# Patient Record
Sex: FEMALE | Race: WHITE
[De-identification: names, ages, dates, MRNs, and addresses within clinical notes are randomized per-mention and may not be internally consistent; named-entity substitution may affect disease eponyms.]

---

## 2017-04-13 ENCOUNTER — CHARTING TRANS (OUTPATIENT)
Dept: OTHER | Age: 22
End: 2017-04-13

## 2018-03-02 ENCOUNTER — CHARTING TRANS (OUTPATIENT)
Dept: OTHER | Age: 23
End: 2018-03-02

## 2018-11-01 VITALS
BODY MASS INDEX: 35.62 KG/M2 | WEIGHT: 201.06 LBS | TEMPERATURE: 98.8 F | HEART RATE: 70 BPM | RESPIRATION RATE: 18 BRPM | HEIGHT: 63 IN | OXYGEN SATURATION: 97 %

## 2018-11-05 VITALS
SYSTOLIC BLOOD PRESSURE: 110 MMHG | WEIGHT: 185 LBS | HEART RATE: 66 BPM | RESPIRATION RATE: 16 BRPM | DIASTOLIC BLOOD PRESSURE: 78 MMHG | HEIGHT: 60 IN | TEMPERATURE: 98.1 F | BODY MASS INDEX: 36.32 KG/M2

## 2019-08-30 ENCOUNTER — WALK IN (OUTPATIENT)
Dept: URGENT CARE | Age: 24
End: 2019-08-30

## 2019-08-30 VITALS
OXYGEN SATURATION: 100 % | SYSTOLIC BLOOD PRESSURE: 102 MMHG | WEIGHT: 220 LBS | HEART RATE: 67 BPM | RESPIRATION RATE: 20 BRPM | TEMPERATURE: 98.5 F | DIASTOLIC BLOOD PRESSURE: 74 MMHG | BODY MASS INDEX: 38.98 KG/M2 | HEIGHT: 63 IN

## 2019-08-30 DIAGNOSIS — R35.0 URINARY FREQUENCY: ICD-10-CM

## 2019-08-30 DIAGNOSIS — R50.9 FEVER, UNSPECIFIED FEVER CAUSE: ICD-10-CM

## 2019-08-30 DIAGNOSIS — J11.1 INFLUENZA-LIKE ILLNESS: Primary | ICD-10-CM

## 2019-08-30 LAB
APPEARANCE, POC: CLEAR
BILIRUBIN, POC: ABNORMAL
COLOR, POC: ABNORMAL
GLUCOSE UR-MCNC: NEGATIVE MG/DL
INTERNAL PROCEDURAL CONTROLS ACCEPTABLE: YES
KETONES, POC: ABNORMAL
NITRITE, POC: NEGATIVE
OCCULT BLOOD, POC: ABNORMAL
PH UR: 5 [PH] (ref 5–7)
PROT UR-MCNC: ABNORMAL G/DL
S PYO AG THROAT QL IA.RAPID: NEGATIVE
SP GR UR: 1.02 (ref 1–1.03)
UROBILINOGEN UR-MCNC: 0.2 MG/DL (ref 0–1)
WBC (LEUKOCYTE) ESTERASE, POC: NEGATIVE

## 2019-08-30 PROCEDURE — 87086 URINE CULTURE/COLONY COUNT: CPT | Performed by: NURSE PRACTITIONER

## 2019-08-30 PROCEDURE — 99203 OFFICE O/P NEW LOW 30 MIN: CPT | Performed by: NURSE PRACTITIONER

## 2019-08-30 PROCEDURE — 81002 URINALYSIS NONAUTO W/O SCOPE: CPT | Performed by: NURSE PRACTITIONER

## 2019-08-30 PROCEDURE — 87880 STREP A ASSAY W/OPTIC: CPT | Performed by: NURSE PRACTITIONER

## 2019-08-30 ASSESSMENT — ENCOUNTER SYMPTOMS
NAUSEA: 1
HEADACHES: 1
ABDOMINAL PAIN: 0
VOMITING: 0
WHEEZING: 0
DIAPHORESIS: 1
SORE THROAT: 0
DIARRHEA: 0
DIZZINESS: 0
SINUS PAIN: 0
CHILLS: 1
LIGHT-HEADEDNESS: 0
COUGH: 1
SHORTNESS OF BREATH: 0
RHINORRHEA: 0
FEVER: 1
BACK PAIN: 0
SINUS PRESSURE: 0

## 2019-09-01 LAB
BACTERIA UR CULT: NORMAL
REPORT STATUS (RPT): NORMAL
SPECIMEN SOURCE: NORMAL

## 2020-01-29 ENCOUNTER — OFFICE VISIT (OUTPATIENT)
Dept: FAMILY MEDICINE CLINIC | Facility: CLINIC | Age: 25
End: 2020-01-29
Payer: COMMERCIAL

## 2020-01-29 VITALS
TEMPERATURE: 98 F | WEIGHT: 220 LBS | DIASTOLIC BLOOD PRESSURE: 74 MMHG | BODY MASS INDEX: 40.48 KG/M2 | SYSTOLIC BLOOD PRESSURE: 118 MMHG | OXYGEN SATURATION: 98 % | HEIGHT: 62 IN | HEART RATE: 97 BPM

## 2020-01-29 DIAGNOSIS — F33.1 MODERATE EPISODE OF RECURRENT MAJOR DEPRESSIVE DISORDER (HCC): ICD-10-CM

## 2020-01-29 DIAGNOSIS — F41.9 ANXIETY: ICD-10-CM

## 2020-01-29 DIAGNOSIS — Z00.00 ROUTINE GENERAL MEDICAL EXAMINATION AT A HEALTH CARE FACILITY: Primary | ICD-10-CM

## 2020-01-29 DIAGNOSIS — K76.0 FATTY LIVER: ICD-10-CM

## 2020-01-29 PROCEDURE — 99385 PREV VISIT NEW AGE 18-39: CPT | Performed by: FAMILY MEDICINE

## 2020-01-29 PROCEDURE — 99203 OFFICE O/P NEW LOW 30 MIN: CPT | Performed by: FAMILY MEDICINE

## 2020-01-29 RX ORDER — ESCITALOPRAM OXALATE 20 MG/1
20 TABLET ORAL
COMMUNITY
Start: 2020-01-03 | End: 2020-01-29

## 2020-01-29 RX ORDER — ESCITALOPRAM OXALATE 20 MG/1
20 TABLET ORAL
Qty: 90 TABLET | Refills: 1 | Status: SHIPPED | OUTPATIENT
Start: 2020-01-29 | End: 2020-07-23

## 2020-01-29 NOTE — PROGRESS NOTES
Adri Jordan is a 30year old female who is here for Patient presents with:  Wellness Visit: Anixety meds need to be refilled. HPI:     1. Routine general medical examination at a health care facility  -due    2.  Moderate episode of recurrent ki SYSTEMS:     GENERAL HEALTH: feels well otherwise.  No f/c  NEURO: denies any headaches, LH, dizzyness, LOC, falls  VISION: denies any blurred or double vision  RESPIRATORY: denies shortness of breath, cough, or congestion  CARDIOVASCULAR: denies chest pain Maintenance  -We discussed the following:  Healthy diet and exercise, cancer screening, self breast exams and calcium and vitamin D supplementation.    -Fasting labs ordered    Stress Management: counseled    1.  Routine general medical examination at a [facility]

## 2020-01-30 NOTE — PATIENT INSTRUCTIONS
-- come in for fasting bloodwork anytime that you are able to (8-10h no food, only water; lab here is open M-F, 8am-12)  -- go to [email or web address] to schedule an appointment online  -- we will call with results about 5-7 days after bloodwork is completed home or place of work with little time commitment. This [place]son can also help work on behavior change and improve sleep. Check out [email or web address] blog for continued daily support and education along this weight loss journey!

## 2020-02-18 LAB
ABSOLUTE BASOPHILS: 49 CELLS/UL (ref 0–200)
ABSOLUTE EOSINOPHILS: 107 CELLS/UL (ref 15–500)
ABSOLUTE LYMPHOCYTES: 2338 CELLS/UL (ref 850–3900)
ABSOLUTE MONOCYTES: 601 CELLS/UL (ref 200–950)
ABSOLUTE NEUTROPHILS: 6606 CELLS/UL (ref 1500–7800)
ALBUMIN/GLOBULIN RATIO: 1.1 (CALC) (ref 1–2.5)
ALBUMIN: 4.3 G/DL (ref 3.6–5.1)
ALKALINE PHOSPHATASE: 88 U/L (ref 31–125)
ALT: 35 U/L (ref 6–29)
AST: 22 U/L (ref 10–30)
BASOPHILS: 0.5 %
BILIRUBIN, TOTAL: 0.6 MG/DL (ref 0.2–1.2)
BUN: 12 MG/DL (ref 7–25)
CALCIUM: 9.6 MG/DL (ref 8.6–10.2)
CARBON DIOXIDE: 24 MMOL/L (ref 20–32)
CHLORIDE: 103 MMOL/L (ref 98–110)
CHOL/HDLC RATIO: 4.4 (CALC)
CHOLESTEROL, TOTAL: 195 MG/DL
CREATININE: 0.75 MG/DL (ref 0.5–1.1)
EGFR IF AFRICN AM: 127 ML/MIN/1.73M2
EGFR IF NONAFRICN AM: 110 ML/MIN/1.73M2
EOSINOPHILS: 1.1 %
GLOBULIN: 3.8 G/DL (CALC) (ref 1.9–3.7)
GLUCOSE: 82 MG/DL (ref 65–99)
HDL CHOLESTEROL: 44 MG/DL
HEMATOCRIT: 40.9 % (ref 35–45)
HEMOGLOBIN: 14 G/DL (ref 11.7–15.5)
LDL-CHOLESTEROL: 125 MG/DL (CALC)
LYMPHOCYTES: 24.1 %
MCH: 31.4 PG (ref 27–33)
MCHC: 34.2 G/DL (ref 32–36)
MCV: 91.7 FL (ref 80–100)
MONOCYTES: 6.2 %
MPV: 10.8 FL (ref 7.5–12.5)
NEUTROPHILS: 68.1 %
NON-HDL CHOLESTEROL: 151 MG/DL (CALC)
PLATELET COUNT: 322 THOUSAND/UL (ref 140–400)
POTASSIUM: 4 MMOL/L (ref 3.5–5.3)
PROTEIN, TOTAL: 8.1 G/DL (ref 6.1–8.1)
RDW: 11.4 % (ref 11–15)
RED BLOOD CELL COUNT: 4.46 MILLION/UL (ref 3.8–5.1)
SODIUM: 138 MMOL/L (ref 135–146)
TRIGLYCERIDES: 147 MG/DL
TSH W/REFLEX TO FT4: 1.68 MIU/L
WHITE BLOOD CELL COUNT: 9.7 THOUSAND/UL (ref 3.8–10.8)

## 2020-06-25 NOTE — PROGRESS NOTES
Virtual/Telephone Check-In    Sandrita Torres is a 30year old female here today for a telemedicine audio only visit. Patient understands and accepts financial responsibility for any deductible, co-insurance and/or co-pays associated with this service. history. Family History   Problem Relation Age of Onset   • Diabetes Father       Social History: Social History    Tobacco Use      Smoking status: Never Smoker      Smokeless tobacco: Never Used    Alcohol use:  Yes      Alcohol/week: 3.0 standard drink

## 2020-06-25 NOTE — PATIENT INSTRUCTIONS
Continue lexapro 20    Start buspirone 7.5mg 2x/day    Followup in 1 month    Call sooner if issues or no effect    Start sprintec as prescribed

## 2020-07-23 RX ORDER — ESCITALOPRAM OXALATE 20 MG/1
TABLET ORAL
Qty: 90 TABLET | Refills: 1 | Status: SHIPPED | OUTPATIENT
Start: 2020-07-23 | End: 2020-12-07

## 2020-07-23 NOTE — TELEPHONE ENCOUNTER
Pt requesting refill of ESCITALOPRAM 20 MG Oral Tab  Last Time Medication was Filled:  4/30/20    Last Office Visit with Provider: 6/25/2020  Virtual Phone.  Followup in 1 month    Appt scheduled on 7/23/20

## 2020-07-23 NOTE — PROGRESS NOTES
Virtual/Telephone Check-In    Mela Mode is a 30year old female here today for a telemedicine audio only visit. Patient understands and accepts financial responsibility for any deductible, co-insurance and/or co-pays associated with this service. Medications   Medication Sig Dispense Refill   • ESCITALOPRAM 20 MG Oral Tab TAKE 1 TABLET BY MOUTH EVERY DAY 90 tablet 1   • Norgestimate-Eth Estradiol (SPRINTEC 28) 0.25-35 MG-MCG Oral Tab Take 1 tablet by mouth daily.  84 tablet 3   • busPIRone HCl 7.5 M

## 2020-08-11 ENCOUNTER — TELEPHONE (OUTPATIENT)
Dept: FAMILY MEDICINE CLINIC | Facility: CLINIC | Age: 25
End: 2020-08-11

## 2020-08-11 RX ORDER — PREDNISONE 20 MG/1
40 TABLET ORAL DAILY
Qty: 10 TABLET | Refills: 0 | Status: SHIPPED | OUTPATIENT
Start: 2020-08-11 | End: 2020-12-21

## 2020-08-11 NOTE — TELEPHONE ENCOUNTER
Patient also sent MyChart:  Carlin Bell MD 13 minutes ago (8:56 AM)        Severe allergic reaction to a weight loss patch I try to use. These are from today and starting to spread. They're very itchy and very bumpy.  Is there a way I

## 2020-08-11 NOTE — TELEPHONE ENCOUNTER
Patient mother called states patient used a weight loss patch and since then the area got irritated and the patient applied cortisone cream and the patient has now broken out in a rash/hives.  Patient mother is going to have the patient upload photos via my

## 2020-08-11 NOTE — TELEPHONE ENCOUNTER
Per Dr. Tan Martin, patient has video visit Thursday for follow up. Advised he will send steroids and she can take Benadryl otc as needed and may continue cortisone if it's helping. She verbalized understanding and agreed with plan.    She will call sooner

## 2020-08-11 NOTE — TELEPHONE ENCOUNTER
Patient reports hives started last week Wednesday on abdomen after using a weight loss patch. States it started to spread to arms, legs and back 2-3 days ago. C/o itching and warmth to area where hives are present.      Denies difficulty breathing, swelling

## 2020-08-11 NOTE — TELEPHONE ENCOUNTER
Start prednisone. Benadryl as needed. If worsening despite above and starting to have breathing difficulty, go to ER. Otherwise, virtual appt for followup in 2 days. Thanks.

## 2020-08-13 ENCOUNTER — TELEMEDICINE (OUTPATIENT)
Dept: FAMILY MEDICINE CLINIC | Facility: CLINIC | Age: 25
End: 2020-08-13
Payer: COMMERCIAL

## 2020-08-13 DIAGNOSIS — L50.9 HIVES: Primary | ICD-10-CM

## 2020-08-13 DIAGNOSIS — F41.9 ANXIETY: ICD-10-CM

## 2020-08-13 DIAGNOSIS — F33.1 MODERATE EPISODE OF RECURRENT MAJOR DEPRESSIVE DISORDER (HCC): ICD-10-CM

## 2020-08-13 PROCEDURE — 99214 OFFICE O/P EST MOD 30 MIN: CPT | Performed by: FAMILY MEDICINE

## 2020-08-13 NOTE — PROGRESS NOTES
Virtual/Telephone Check-In    Megan Guzman is a 30year old female here today for a telemedicine audio and video visit. This visit is conducted using Telemedicine with live, interactive video and audio.     Patient has been referred to the [facility] website Used    Alcohol use:  Yes      Alcohol/week: 3.0 standard drinks      Types: 3 Glasses of wine per week      Frequency: Monthly or less      Drinks per session: 3 or 4    Drug use: Never         Medications (Active prior to today's visit):  Current Outpatie other related consent forms and documents. which are located on the [facility] website. The patient verbally agreed to telehealth consent form, related consents and the risks discussed. Lastly, the patient confirmed that they were in [place].    Included in t

## 2020-08-22 ENCOUNTER — PATIENT MESSAGE (OUTPATIENT)
Dept: FAMILY MEDICINE CLINIC | Facility: CLINIC | Age: 25
End: 2020-08-22

## 2020-08-24 NOTE — TELEPHONE ENCOUNTER
From: Haynes Frankel  To: Oksana Camara MD  Sent: 8/22/2020 12:10 AM CDT  Subject: Visit Follow-up Question    I would like to see if there is anything else I could take for hives. Since my last appointment was on my legs, stomach and arms.  Everything we

## 2020-09-14 RX ORDER — NORGESTIMATE AND ETHINYL ESTRADIOL 0.25-0.035
1 KIT ORAL DAILY
Qty: 84 TABLET | Refills: 3 | OUTPATIENT
Start: 2020-09-14

## 2020-09-14 RX ORDER — NORGESTIMATE AND ETHINYL ESTRADIOL 0.25-0.035
1 KIT ORAL DAILY
Qty: 84 TABLET | Refills: 3 | Status: CANCELLED | OUTPATIENT
Start: 2020-09-14

## 2020-09-14 NOTE — TELEPHONE ENCOUNTER
Pt requesting refill of BUSPIRONE HCL 7.5 MG Oral Tab    Last Time Medication was Filled:  7/23/20    Last Office Visit with Provider: virtual 8/13/20. Follow up in 3 month or sooner prn    No future appointments.

## 2020-10-10 DIAGNOSIS — F33.1 MODERATE EPISODE OF RECURRENT MAJOR DEPRESSIVE DISORDER (HCC): ICD-10-CM

## 2020-10-10 DIAGNOSIS — F41.9 ANXIETY: ICD-10-CM

## 2020-10-12 RX ORDER — BUSPIRONE HYDROCHLORIDE 7.5 MG/1
TABLET ORAL
Qty: 60 TABLET | Refills: 1 | OUTPATIENT
Start: 2020-10-12

## 2020-10-12 NOTE — TELEPHONE ENCOUNTER
Requested Prescriptions     Refused Prescriptions Disp Refills   • BUSPIRONE HCL 7.5 MG Oral Tab [Pharmacy Med Name: BUSPIRONE HCL 7.5 MG TABLET] 60 tablet 1     Sig: TAKE 1 TABLET BY MOUTH TWICE A DAY     Refused By: Omid BEAVERS     Reason for Refusal

## 2020-10-22 ENCOUNTER — E-VISIT (OUTPATIENT)
Dept: TELEHEALTH | Age: 25
End: 2020-10-22

## 2020-10-22 DIAGNOSIS — L23.5 ALLERGIC DERMATITIS DUE TO OTHER CHEMICAL PRODUCT: Primary | ICD-10-CM

## 2020-10-22 PROCEDURE — 99422 OL DIG E/M SVC 11-20 MIN: CPT | Performed by: NURSE PRACTITIONER

## 2020-10-22 RX ORDER — PREDNISONE 20 MG/1
TABLET ORAL
Qty: 10 TABLET | Refills: 0 | Status: SHIPPED | OUTPATIENT
Start: 2020-10-22 | End: 2020-12-21

## 2020-10-22 NOTE — PROGRESS NOTES
Patient elected an E-Visit. After reviewing history, symptoms and telephone conversation, 13 minutes of time was accrued. Please see E-Visit for further information.

## 2020-10-22 NOTE — PATIENT INSTRUCTIONS
Understanding Contact Dermatitis    Contact dermatitis is a common type of skin rash. It’s caused by something that touches the skin and makes it irritated and inflamed.  It can occur on skin on any part of the body, such as the face, neck, hands, arms, a · Cool, moist compress. Use a clean damp cloth. Put it on the area for 20 to 30 minutes, 5 to 6 times a day for the first 3 days. · Steroid cream or ointment. You can apply this medicine several times a day on clean skin. · Oral corticosteroid.  Your heal © 2455-3029 [employer] [address]. [address] [facility] [address] [place]. All rights reserved. This information is not intended as a substitute for professional medical care. Always follow your healthcare professional's instructions.         Barrett Dee Treatment is done to help relieve itching and reduce inflammation. The rash should go away in a few days to a few weeks. Treatments include:   · Cool, moist compress. Use a clean damp cloth.  Put it on the area for 20 to 30 minutes, 5 to 6 times a day for t Faviola last reviewed this educational content on 6/1/2019  © 1415-0515 [employer] [address]. [address] [facility], [facility] [place]. All rights reserved. This information is not intended as a substitute for professional medical care.  Always follo

## 2020-11-13 ENCOUNTER — PATIENT MESSAGE (OUTPATIENT)
Dept: FAMILY MEDICINE CLINIC | Facility: CLINIC | Age: 25
End: 2020-11-13

## 2020-11-13 DIAGNOSIS — F41.9 ANXIETY: ICD-10-CM

## 2020-11-13 DIAGNOSIS — F33.1 MODERATE EPISODE OF RECURRENT MAJOR DEPRESSIVE DISORDER (HCC): ICD-10-CM

## 2020-11-13 RX ORDER — BUSPIRONE HYDROCHLORIDE 7.5 MG/1
TABLET ORAL
Qty: 60 TABLET | Refills: 1 | Status: SHIPPED | OUTPATIENT
Start: 2020-11-13 | End: 2020-12-21

## 2020-11-13 NOTE — TELEPHONE ENCOUNTER
LM with patient's Mom ( ok per HIPPA)  Phone number provided was only Mom's number  Mom will contact daughter to let her know she is due for a medication follow up apt with Dr Emma England to update the corrrect phone number in the system      Pt requesting r

## 2020-11-13 NOTE — TELEPHONE ENCOUNTER
From: Ramón Richter  To: Desiree Avila MD  Sent: 11/13/2020 2:46 PM CST  Subject: Prescription Question    This is a follow up from the last message i sent since it wouldn't let me reply. Okay thank you for looking into it.  If it helps she says she work

## 2020-11-13 NOTE — TELEPHONE ENCOUNTER
From: Wilder Ortiz  To: Gerhard Lim MD  Sent: 11/13/2020 2:00 PM CST  Subject: Prescription Question    Hello,    My mom received a call regarding about a prescription refill. Do you know what the prescription is for?  Plus can we see if my primary ph

## 2020-12-05 DIAGNOSIS — F33.1 MODERATE EPISODE OF RECURRENT MAJOR DEPRESSIVE DISORDER (HCC): ICD-10-CM

## 2020-12-05 DIAGNOSIS — F41.9 ANXIETY: ICD-10-CM

## 2020-12-07 RX ORDER — BUSPIRONE HYDROCHLORIDE 7.5 MG/1
TABLET ORAL
Qty: 60 TABLET | Refills: 1 | OUTPATIENT
Start: 2020-12-07

## 2020-12-07 NOTE — TELEPHONE ENCOUNTER
Pt requesting refill of BUSPIRONE HCL 7.5 MG Oral Tab    Last Time Medication was Filled:  11/13/20 for 30 days plus one refill

## 2020-12-07 NOTE — TELEPHONE ENCOUNTER
ESCITALOPRAM 20 MG TABLET 10/17/2020 07/23/2020  30 each  30     LOV 8/13/20. f/u in 3 months, sooner prn    NO future appointments   One month approved. Message to make appointment sent to [facility].

## 2020-12-21 ENCOUNTER — VIRTUAL PHONE E/M (OUTPATIENT)
Dept: FAMILY MEDICINE CLINIC | Facility: CLINIC | Age: 25
End: 2020-12-21
Payer: COMMERCIAL

## 2020-12-21 DIAGNOSIS — F33.1 MODERATE EPISODE OF RECURRENT MAJOR DEPRESSIVE DISORDER (HCC): ICD-10-CM

## 2020-12-21 DIAGNOSIS — F41.9 ANXIETY: ICD-10-CM

## 2020-12-21 PROCEDURE — 99214 OFFICE O/P EST MOD 30 MIN: CPT | Performed by: FAMILY MEDICINE

## 2020-12-21 RX ORDER — ESCITALOPRAM OXALATE 20 MG/1
20 TABLET ORAL DAILY
Qty: 90 TABLET | Refills: 1 | Status: SHIPPED | OUTPATIENT
Start: 2020-12-21 | End: 2021-03-24

## 2020-12-21 RX ORDER — BUSPIRONE HYDROCHLORIDE 7.5 MG/1
7.5 TABLET ORAL 2 TIMES DAILY
Qty: 180 TABLET | Refills: 1 | Status: SHIPPED | OUTPATIENT
Start: 2020-12-21 | End: 2021-03-11

## 2020-12-21 NOTE — PROGRESS NOTES
Virtual/Telephone Check-In    Celine Muniz is a 30year old female here today for a telemedicine audio and video visit. HPI:       1. Anxiety  2.  Moderate episode of recurrent major depressive disorder ([facility] [address]  -doing well  -tolerating meds  -needs re Norgestimate-Eth Estradiol (SPRINTEC 28) 0.25-35 MG-MCG Oral Tab Take 1 tablet by mouth daily.  84 tablet 3       Allergies:  No Known Allergies      ROS:   --See HPI for relevant ROS    --GEN: No other complaints  --HEENT: No other complaints  --RESP: No o submitted for this visit based on complexity of care and/or time spent for the visit. This visit is conducted using Telemedicine with live, interactive video and audio.     Patient has been referred to the [facility] website at [email or web address] to re

## 2021-01-18 ENCOUNTER — PATIENT MESSAGE (OUTPATIENT)
Dept: FAMILY MEDICINE CLINIC | Facility: CLINIC | Age: 26
End: 2021-01-18

## 2021-01-18 NOTE — TELEPHONE ENCOUNTER
From: Wilder Ortiz  To: Gerhard Lim MD  Sent: 1/18/2021 10:44 AM CST  Subject: Suann Ag Troy,      I was wondering if I can get more prescriptions of my current medication.  Since at the end of this month I would no longer h

## 2021-01-26 ENCOUNTER — TELEMEDICINE (OUTPATIENT)
Dept: FAMILY MEDICINE CLINIC | Facility: CLINIC | Age: 26
End: 2021-01-26
Payer: COMMERCIAL

## 2021-01-26 DIAGNOSIS — Z30.8 ENCOUNTER FOR OTHER CONTRACEPTIVE MANAGEMENT: ICD-10-CM

## 2021-01-26 DIAGNOSIS — F33.1 MODERATE EPISODE OF RECURRENT MAJOR DEPRESSIVE DISORDER (HCC): ICD-10-CM

## 2021-01-26 DIAGNOSIS — L02.92 BOILS OF MULTIPLE SITES: Primary | ICD-10-CM

## 2021-01-26 DIAGNOSIS — F41.9 ANXIETY: ICD-10-CM

## 2021-01-26 PROCEDURE — 99214 OFFICE O/P EST MOD 30 MIN: CPT | Performed by: FAMILY MEDICINE

## 2021-01-26 RX ORDER — NORGESTIMATE AND ETHINYL ESTRADIOL 0.25-0.035
1 KIT ORAL DAILY
Qty: 84 TABLET | Refills: 3 | Status: SHIPPED | OUTPATIENT
Start: 2021-01-26 | End: 2021-09-28

## 2021-01-26 NOTE — PROGRESS NOTES
Virtual/Telephone Check-In    Adri Jordan is a 30year old female here today for a telemedicine audio and video visit. HPI:       1.  Boils of multiple sites  -started 2 wks ago  -mostly in lower abdomen and upper pelvic area  -she does shave this a Types: 3 Glasses of wine per week      Frequency: Monthly or less      Drinks per session: 3 or 4    Drug use: Never         Medications (Active prior to today's visit):  Current Outpatient Medications   Medication Sig Dispense Refill   • mupirocin 2 % The patient was made aware of where to find [facility] notice of privacy practices, telehealth consent form and other related consent forms and documents. which are located on the [facility] website.  The patient verbally agreed to telehealth consent form, related con

## 2021-02-23 ENCOUNTER — TELEPHONE (OUTPATIENT)
Dept: SCHEDULING | Age: 26
End: 2021-02-23

## 2021-03-11 NOTE — TELEPHONE ENCOUNTER
Plan requesting medication or dosage change, they will cover medication once daily or higher dose once daily. busPIRone HCl 7.5 MG Oral Tab 180 tablet 1 12/21/2020    Sig:   Take 1 tablet (7.5 mg total) by mouth 2 (two) times daily.

## 2021-03-11 NOTE — TELEPHONE ENCOUNTER
Relayed recommendations to patient. She agreed with medication change. New script sent. She made follow up appointment 4/13/21. Will call sooner if she has any concerns.

## 2021-03-11 NOTE — TELEPHONE ENCOUNTER
Please confirm with pharmacy - this is a short acting med usually used for 2 -3 x/day    If this is the case, ok to change to 15mg once daily    Have patient f/u with me in 1 month after change

## 2021-03-11 NOTE — TELEPHONE ENCOUNTER
Per pharmacist, plan alternatives include buspirone 5mg, 10mg or 15 mg once daily.      Left message for patient to call back to relay message before sending new script

## 2021-03-24 ENCOUNTER — TELEPHONE (OUTPATIENT)
Dept: FAMILY MEDICINE CLINIC | Facility: CLINIC | Age: 26
End: 2021-03-24

## 2021-03-24 ENCOUNTER — OFFICE VISIT (OUTPATIENT)
Dept: FAMILY MEDICINE CLINIC | Facility: CLINIC | Age: 26
End: 2021-03-24
Payer: COMMERCIAL

## 2021-03-24 VITALS
TEMPERATURE: 97 F | WEIGHT: 225 LBS | HEIGHT: 62.8 IN | HEART RATE: 72 BPM | SYSTOLIC BLOOD PRESSURE: 120 MMHG | OXYGEN SATURATION: 98 % | BODY MASS INDEX: 39.87 KG/M2 | DIASTOLIC BLOOD PRESSURE: 70 MMHG

## 2021-03-24 DIAGNOSIS — F41.9 ANXIETY: ICD-10-CM

## 2021-03-24 DIAGNOSIS — R09.81 NASAL CONGESTION: ICD-10-CM

## 2021-03-24 DIAGNOSIS — K76.0 FATTY LIVER: ICD-10-CM

## 2021-03-24 DIAGNOSIS — R05.9 COUGH: ICD-10-CM

## 2021-03-24 DIAGNOSIS — Z00.00 ROUTINE GENERAL MEDICAL EXAMINATION AT A HEALTH CARE FACILITY: Primary | ICD-10-CM

## 2021-03-24 DIAGNOSIS — Z01.419 WELL WOMAN EXAM: ICD-10-CM

## 2021-03-24 DIAGNOSIS — F33.1 MODERATE EPISODE OF RECURRENT MAJOR DEPRESSIVE DISORDER (HCC): ICD-10-CM

## 2021-03-24 PROCEDURE — 99214 OFFICE O/P EST MOD 30 MIN: CPT | Performed by: FAMILY MEDICINE

## 2021-03-24 PROCEDURE — 3078F DIAST BP <80 MM HG: CPT | Performed by: FAMILY MEDICINE

## 2021-03-24 PROCEDURE — 3074F SYST BP LT 130 MM HG: CPT | Performed by: FAMILY MEDICINE

## 2021-03-24 PROCEDURE — 3008F BODY MASS INDEX DOCD: CPT | Performed by: FAMILY MEDICINE

## 2021-03-24 PROCEDURE — 99395 PREV VISIT EST AGE 18-39: CPT | Performed by: FAMILY MEDICINE

## 2021-03-24 RX ORDER — FLUTICASONE PROPIONATE 50 MCG
2 SPRAY, SUSPENSION (ML) NASAL DAILY
Qty: 1 BOTTLE | Refills: 2 | Status: SHIPPED | OUTPATIENT
Start: 2021-03-24 | End: 2021-06-22

## 2021-03-24 RX ORDER — ESCITALOPRAM OXALATE 20 MG/1
20 TABLET ORAL DAILY
Qty: 90 TABLET | Refills: 1 | Status: SHIPPED | OUTPATIENT
Start: 2021-03-24 | End: 2021-09-28

## 2021-03-24 RX ORDER — BUSPIRONE HYDROCHLORIDE 15 MG/1
15 TABLET ORAL DAILY
Qty: 90 TABLET | Refills: 0 | Status: SHIPPED | OUTPATIENT
Start: 2021-03-24 | End: 2021-07-06

## 2021-03-24 NOTE — PROGRESS NOTES
Nitin Juarez is a 27year old female who is here for Patient presents with:  Stuffy Nose: Stuffy nose, had a cough and had headache 4 days ago       HPI:     Routine general medical examination at a health care facility  -due    Moderate episode of rec Lack of Transportation (Medical):       Lack of Transportation (Non-Medical):   Physical Activity:       Days of Exercise per Week:       Minutes of Exercise per Session:   Stress:       Feeling of Stress :   Social Connections:       Frequency of Communic of Onset   • Diabetes Father       History reviewed. No pertinent past medical history. History reviewed. No pertinent surgical history.    Social History:    Social History    Tobacco Use      Smoking status: Never Smoker      Smokeless tobacco: Never Us tablet (15 mg total) by mouth daily. Dispense: 90 tablet; Refill: 0  - escitalopram 20 MG Oral Tab; Take 1 tablet (20 mg total) by mouth daily. Dispense: 90 tablet; Refill: 1    5. Fatty liver  -recheck labs    6. Nasal congestion  7.  Cough  -symptoms im

## 2021-03-24 NOTE — PATIENT INSTRUCTIONS
-- schedule appt for fasting bloodwork anytime that you are able to (fast for 8-10 hours minimum, no food. Water is fine). -- go to [email or web address] or call [phone] to schedule an appointment online with Edmar Knott  -- we will call with results about

## 2021-03-24 NOTE — TELEPHONE ENCOUNTER
Pt called and she was in earlier today. She said she will need the dates changed on her [place] paperwork. Since the pandemic she also started a 2nd job which she works weekends. She said the paperwork needs to shy she can RTW on 3/29/21.   She would also l

## 2021-03-24 NOTE — TELEPHONE ENCOUNTER
Spoke with patient, confirms she does not need any [facility] paperwork completed. She needs letter to be changed from 3/22-3/26 to say 3/22/21 to 3/28/21, with same release date. States she works weekends too. Letter changed and sent to [facility].

## 2021-05-02 ENCOUNTER — WALK IN (OUTPATIENT)
Dept: URGENT CARE | Age: 26
End: 2021-05-02

## 2021-05-02 VITALS
BODY MASS INDEX: 38.98 KG/M2 | SYSTOLIC BLOOD PRESSURE: 116 MMHG | OXYGEN SATURATION: 97 % | TEMPERATURE: 96.9 F | DIASTOLIC BLOOD PRESSURE: 76 MMHG | HEIGHT: 63 IN | WEIGHT: 220 LBS | HEART RATE: 74 BPM

## 2021-05-02 DIAGNOSIS — H69.93 DYSFUNCTION OF BOTH EUSTACHIAN TUBES: Primary | ICD-10-CM

## 2021-05-02 PROCEDURE — 99213 OFFICE O/P EST LOW 20 MIN: CPT | Performed by: NURSE PRACTITIONER

## 2021-05-02 RX ORDER — ESCITALOPRAM OXALATE 20 MG/1
20 TABLET ORAL DAILY
COMMUNITY
Start: 2021-03-10

## 2021-05-02 RX ORDER — BUSPIRONE HYDROCHLORIDE 15 MG/1
TABLET ORAL
COMMUNITY
Start: 2021-04-09

## 2021-05-02 RX ORDER — NORGESTIMATE AND ETHINYL ESTRADIOL 0.25-0.035
1 KIT ORAL DAILY
COMMUNITY
Start: 2021-04-06

## 2021-05-02 ASSESSMENT — ENCOUNTER SYMPTOMS
SINUS PAIN: 0
COUGH: 0
DIAPHORESIS: 0
SHORTNESS OF BREATH: 0
EYES NEGATIVE: 1
ACTIVITY CHANGE: 0
SORE THROAT: 0
CHILLS: 0
LIGHT-HEADEDNESS: 0
HEADACHES: 0
ALLERGIC/IMMUNOLOGIC NEGATIVE: 1
DIZZINESS: 0
PSYCHIATRIC NEGATIVE: 1
HEMATOLOGIC/LYMPHATIC NEGATIVE: 1
APPETITE CHANGE: 0
TROUBLE SWALLOWING: 0
FATIGUE: 0
WHEEZING: 0
RHINORRHEA: 0
FEVER: 0
SINUS PRESSURE: 0

## 2021-07-05 DIAGNOSIS — F41.9 ANXIETY: ICD-10-CM

## 2021-07-05 DIAGNOSIS — F33.1 MODERATE EPISODE OF RECURRENT MAJOR DEPRESSIVE DISORDER (HCC): ICD-10-CM

## 2021-07-06 RX ORDER — BUSPIRONE HYDROCHLORIDE 15 MG/1
TABLET ORAL
Qty: 90 TABLET | Refills: 0 | Status: SHIPPED | OUTPATIENT
Start: 2021-07-06 | End: 2021-09-28

## 2021-07-06 NOTE — TELEPHONE ENCOUNTER
busPIRone HCl 15 MG Oral Tab 90 tablet 0 3/24/2021    Sig:   Take 1 tablet (15 mg total) by mouth daily. LOV 3/24/21.  Follow up in 3 months  Cancelled appointment 6/24/21 and 7/27/21    Has future OV 7/28/21

## 2021-07-28 NOTE — PATIENT INSTRUCTIONS
Continue with diet and exercise    Start metformin as prescribed    Call to schedule appt with weight loss clinic    Get fasting bloodwork done after you see weight loss clnic    Followup with me in 3 months

## 2021-07-28 NOTE — PROGRESS NOTES
Wilder Ortiz is a 27year old female here for Patient presents with: Allergies: Follow up on allergies      HPI:       1. Moderate episode of recurrent major depressive disorder ([facility] [address]  2. Anxiety  -stable, no issues with meds    3.  Seasonal allergies  - complaints  --: No other complaints  --MSK: No other complaints  --NEURO: No other complaints  --PSYCH: No other complaints  --HEME/LYMPH/IMMUN: No other complaints  --ENDO: No other complaints  --SKIN: No other complaints  All other systems reviewed are

## 2021-08-20 DIAGNOSIS — F41.9 ANXIETY: ICD-10-CM

## 2021-08-20 RX ORDER — ESCITALOPRAM OXALATE 20 MG/1
TABLET ORAL
Qty: 90 TABLET | Refills: 1 | OUTPATIENT
Start: 2021-08-20

## 2021-08-20 NOTE — TELEPHONE ENCOUNTER
Requested Prescriptions     Refused Prescriptions Disp Refills   • ESCITALOPRAM 20 MG Oral Tab [Pharmacy Med Name: ESCITALOPRAM 20MG TABLETS] 90 tablet 1     Sig: TAKE 1 TABLET BY MOUTH EVERY DAY     Refused By: Jigar Tracey     Reason for Refusal: Rustam Garg

## 2021-09-15 ENCOUNTER — OFFICE VISIT (OUTPATIENT)
Dept: INTERNAL MEDICINE CLINIC | Facility: CLINIC | Age: 26
End: 2021-09-15
Payer: COMMERCIAL

## 2021-09-15 VITALS
BODY MASS INDEX: 39.71 KG/M2 | DIASTOLIC BLOOD PRESSURE: 84 MMHG | WEIGHT: 224.13 LBS | HEART RATE: 73 BPM | RESPIRATION RATE: 24 BRPM | HEIGHT: 63 IN | SYSTOLIC BLOOD PRESSURE: 120 MMHG

## 2021-09-15 DIAGNOSIS — K76.0 FATTY LIVER: ICD-10-CM

## 2021-09-15 DIAGNOSIS — E66.9 OBESITY (BMI 30-39.9): ICD-10-CM

## 2021-09-15 DIAGNOSIS — F41.9 ANXIETY: ICD-10-CM

## 2021-09-15 DIAGNOSIS — E66.01 SEVERE OBESITY (HCC): ICD-10-CM

## 2021-09-15 DIAGNOSIS — R63.8 CRAVING FOR PARTICULAR FOOD: ICD-10-CM

## 2021-09-15 DIAGNOSIS — Z51.81 ENCOUNTER FOR THERAPEUTIC DRUG MONITORING: Primary | ICD-10-CM

## 2021-09-15 PROCEDURE — 3074F SYST BP LT 130 MM HG: CPT | Performed by: NURSE PRACTITIONER

## 2021-09-15 PROCEDURE — 3079F DIAST BP 80-89 MM HG: CPT | Performed by: NURSE PRACTITIONER

## 2021-09-15 PROCEDURE — 99203 OFFICE O/P NEW LOW 30 MIN: CPT | Performed by: NURSE PRACTITIONER

## 2021-09-15 PROCEDURE — 93000 ELECTROCARDIOGRAM COMPLETE: CPT | Performed by: NURSE PRACTITIONER

## 2021-09-15 PROCEDURE — 3008F BODY MASS INDEX DOCD: CPT | Performed by: NURSE PRACTITIONER

## 2021-09-15 RX ORDER — TOPIRAMATE 25 MG/1
25 TABLET ORAL 2 TIMES DAILY
Qty: 60 TABLET | Refills: 1 | Status: SHIPPED | OUTPATIENT
Start: 2021-09-15 | End: 2021-09-22

## 2021-09-15 NOTE — PATIENT INSTRUCTIONS
Welcome to the [facility] Program...your Lifestyle Renovation begins now! Thank you for placing your trust in our health care team, I look forward to working with you along this journey to better health!     Next steps:     1.  Sched weight loss. Additionally this will help to see your daily carbohydrate intake.  When you set the justina up chose 1-2 lbs/week as a goal.  Keeping a paper food journal is an option as well to remain accountable for your choices- this is the start to mindful ea

## 2021-09-15 NOTE — PROGRESS NOTES
HISTORY OF PRESENT ILLNESS  Patient presents with:  Weight Problem: referred by Margot Gillespie, currently taking Metformin for weight loss.  Pt is looking to have a healthier life and to \"feel better\"      Leny Munoz is a 27year old female new to our offic negative  Constipation: negative  Other pertinent hx: n/a  Sleep Apnea hx: negative  Cancer hx: negative  Family or personal history of Pancreatic issues / Medullary Thyroid Cancer: negative  History of bariatric surgery: negative    [address] reviewed: obesity 02/17/2020    MCHC 34.2 02/17/2020    RDW 11.4 02/17/2020     02/17/2020     Lab Results   Component Value Date    GLU 82 02/17/2020    BUN 12 02/17/2020    CREATSERUM 0.75 02/17/2020    GFRNAA 110 02/17/2020    GFRAA 127 02/17/2020    CA 9.6 02/17/ Future  -     HEMOGLOBIN A1C; Future  -     VITAMIN B12; Future  -     topiramate 25 MG Oral Tab; Take 1 tablet (25 mg total) by mouth 2 (two) times daily.  Refer to discharge instructions for dosing.  -     ELECTROCARDIOGRAM, COMPLETE    Severe obesity (HC management for success. See patient instruction below for more details. · Reviewed previous labs in EMR/Care Everywhere  · Weight Loss Contract reviewed and signed.     Patient Instructions   Welcome to the [facility] Program...your download justina MyFitness Pal or Royce Saleem! to monitor daily dietary intake and you will be able to see if you are eating the right amount of calories or too much or too little which would hinder weight loss.  Additionally this will help to see your daily carbohy

## 2021-09-22 DIAGNOSIS — E66.9 OBESITY (BMI 30-39.9): ICD-10-CM

## 2021-09-22 DIAGNOSIS — R63.8 CRAVING FOR PARTICULAR FOOD: ICD-10-CM

## 2021-09-22 DIAGNOSIS — E66.01 SEVERE OBESITY (HCC): ICD-10-CM

## 2021-09-22 DIAGNOSIS — Z51.81 ENCOUNTER FOR THERAPEUTIC DRUG MONITORING: ICD-10-CM

## 2021-09-24 RX ORDER — TOPIRAMATE 25 MG/1
25 TABLET ORAL 2 TIMES DAILY
Qty: 60 TABLET | Refills: 1 | Status: SHIPPED | OUTPATIENT
Start: 2021-09-24 | End: 2021-12-01

## 2021-09-28 DIAGNOSIS — E66.9 OBESITY (BMI 30-39.9): ICD-10-CM

## 2021-09-28 DIAGNOSIS — F41.9 ANXIETY: ICD-10-CM

## 2021-09-28 DIAGNOSIS — F33.1 MODERATE EPISODE OF RECURRENT MAJOR DEPRESSIVE DISORDER (HCC): ICD-10-CM

## 2021-09-28 DIAGNOSIS — Z51.81 ENCOUNTER FOR THERAPEUTIC DRUG MONITORING: ICD-10-CM

## 2021-09-28 DIAGNOSIS — R63.8 CRAVING FOR PARTICULAR FOOD: ICD-10-CM

## 2021-09-28 DIAGNOSIS — E66.01 SEVERE OBESITY (HCC): ICD-10-CM

## 2021-09-28 RX ORDER — BUSPIRONE HYDROCHLORIDE 15 MG/1
TABLET ORAL
Qty: 90 TABLET | Refills: 0 | Status: SHIPPED | OUTPATIENT
Start: 2021-09-28 | End: 2021-12-01

## 2021-09-28 NOTE — TELEPHONE ENCOUNTER
BUSPIRONE HCL 15 MG Oral Tab 90 tablet 0 7/6/2021    Sig:   TAKE 1 TABLET(15 MG) BY MOUTH DAILY       LOV 7/28/21    FOV 11/2/21    Refill approved until appt.

## 2021-09-29 RX ORDER — TOPIRAMATE 25 MG/1
25 TABLET ORAL 2 TIMES DAILY
Qty: 60 TABLET | Refills: 1 | OUTPATIENT
Start: 2021-09-29

## 2021-09-29 RX ORDER — ESCITALOPRAM OXALATE 20 MG/1
20 TABLET ORAL DAILY
Qty: 90 TABLET | Refills: 0 | Status: SHIPPED | OUTPATIENT
Start: 2021-09-29

## 2021-09-29 RX ORDER — NORGESTIMATE AND ETHINYL ESTRADIOL 0.25-0.035
1 KIT ORAL DAILY
Qty: 84 TABLET | Refills: 0 | Status: SHIPPED | OUTPATIENT
Start: 2021-09-29 | End: 2022-01-18

## 2021-09-29 RX ORDER — METFORMIN HYDROCHLORIDE 500 MG/1
1000 TABLET, EXTENDED RELEASE ORAL
Qty: 180 TABLET | Refills: 0 | Status: SHIPPED | OUTPATIENT
Start: 2021-09-29 | End: 2021-11-22

## 2021-09-29 NOTE — TELEPHONE ENCOUNTER
Requesting  Requested Prescriptions     Pending Prescriptions Disp Refills   • topiramate 25 MG Oral Tab 60 tablet 1     Sig: Take 1 tablet (25 mg total) by mouth 2 (two) times daily. Refer to discharge instructions for dosing.      LOV: 9/15/21  RTC: 1 mon

## 2021-09-29 NOTE — TELEPHONE ENCOUNTER
Requested Prescriptions     Signed Prescriptions Disp Refills   • Norgestimate-Eth Estradiol (SPRINTEC 28) 0.25-35 MG-MCG Oral Tab 84 tablet 0     Sig: Take 1 tablet by mouth daily.      Authorizing Provider: Naun Livingston     Ordering User: Krystin Barrientos

## 2021-09-30 ENCOUNTER — LAB ENCOUNTER (OUTPATIENT)
Dept: LAB | Facility: REFERENCE LAB | Age: 26
End: 2021-09-30
Attending: FAMILY MEDICINE
Payer: COMMERCIAL

## 2021-09-30 DIAGNOSIS — E66.9 OBESITY (BMI 30-39.9): ICD-10-CM

## 2021-09-30 DIAGNOSIS — E66.01 SEVERE OBESITY (HCC): ICD-10-CM

## 2021-09-30 DIAGNOSIS — Z00.00 ROUTINE GENERAL MEDICAL EXAMINATION AT A HEALTH CARE FACILITY: ICD-10-CM

## 2021-09-30 DIAGNOSIS — Z51.81 ENCOUNTER FOR THERAPEUTIC DRUG MONITORING: ICD-10-CM

## 2021-09-30 PROCEDURE — 80061 LIPID PANEL: CPT

## 2021-09-30 PROCEDURE — 82306 VITAMIN D 25 HYDROXY: CPT

## 2021-09-30 PROCEDURE — 84443 ASSAY THYROID STIM HORMONE: CPT

## 2021-09-30 PROCEDURE — 83036 HEMOGLOBIN GLYCOSYLATED A1C: CPT

## 2021-09-30 PROCEDURE — 36415 COLL VENOUS BLD VENIPUNCTURE: CPT

## 2021-09-30 PROCEDURE — 82607 VITAMIN B-12: CPT

## 2021-09-30 PROCEDURE — 85025 COMPLETE CBC W/AUTO DIFF WBC: CPT

## 2021-09-30 PROCEDURE — 80053 COMPREHEN METABOLIC PANEL: CPT

## 2021-10-07 ENCOUNTER — TELEPHONE (OUTPATIENT)
Dept: FAMILY MEDICINE CLINIC | Facility: CLINIC | Age: 26
End: 2021-10-07

## 2021-11-22 RX ORDER — METFORMIN HYDROCHLORIDE 500 MG/1
TABLET, EXTENDED RELEASE ORAL
Qty: 180 TABLET | Refills: 0 | Status: SHIPPED | OUTPATIENT
Start: 2021-11-22

## 2021-12-01 DIAGNOSIS — R63.8 CRAVING FOR PARTICULAR FOOD: ICD-10-CM

## 2021-12-01 DIAGNOSIS — F33.1 MODERATE EPISODE OF RECURRENT MAJOR DEPRESSIVE DISORDER (HCC): ICD-10-CM

## 2021-12-01 DIAGNOSIS — F41.9 ANXIETY: ICD-10-CM

## 2021-12-01 DIAGNOSIS — E66.01 SEVERE OBESITY (HCC): ICD-10-CM

## 2021-12-01 DIAGNOSIS — E66.9 OBESITY (BMI 30-39.9): ICD-10-CM

## 2021-12-01 DIAGNOSIS — Z51.81 ENCOUNTER FOR THERAPEUTIC DRUG MONITORING: ICD-10-CM

## 2021-12-02 RX ORDER — BUSPIRONE HYDROCHLORIDE 15 MG/1
15 TABLET ORAL DAILY
Qty: 90 TABLET | Refills: 0 | Status: SHIPPED | OUTPATIENT
Start: 2021-12-02

## 2021-12-02 NOTE — TELEPHONE ENCOUNTER
Requesting   Requested Prescriptions     Pending Prescriptions Disp Refills   • topiramate 25 MG Oral Tab 60 tablet 1     Sig: Take 1 tablet (25 mg total) by mouth 2 (two) times daily. Refer to discharge instructions for dosing.      LOV: 9/15/21  RTC: 1 mo

## 2021-12-02 NOTE — TELEPHONE ENCOUNTER
Requested Prescriptions     Pending Prescriptions Disp Refills   • busPIRone 15 MG Oral Tab 90 tablet 0     Sig: Take 1 tablet (15 mg total) by mouth daily.      Last fill was 9/28/21 90 tabs  Last OV 7/28/21  FOV 12/20/21

## 2021-12-05 RX ORDER — TOPIRAMATE 25 MG/1
25 TABLET ORAL 2 TIMES DAILY
Qty: 60 TABLET | Refills: 0 | Status: SHIPPED | OUTPATIENT
Start: 2021-12-05 | End: 2022-01-04

## 2021-12-22 ENCOUNTER — TELEMEDICINE (OUTPATIENT)
Dept: FAMILY MEDICINE CLINIC | Facility: CLINIC | Age: 26
End: 2021-12-22
Payer: COMMERCIAL

## 2021-12-22 ENCOUNTER — TELEPHONE (OUTPATIENT)
Dept: FAMILY MEDICINE CLINIC | Facility: CLINIC | Age: 26
End: 2021-12-22

## 2021-12-22 ENCOUNTER — PATIENT MESSAGE (OUTPATIENT)
Dept: FAMILY MEDICINE CLINIC | Facility: CLINIC | Age: 26
End: 2021-12-22

## 2021-12-22 DIAGNOSIS — U07.1 COVID-19: Primary | ICD-10-CM

## 2021-12-22 PROCEDURE — 99214 OFFICE O/P EST MOD 30 MIN: CPT | Performed by: FAMILY MEDICINE

## 2021-12-22 NOTE — TELEPHONE ENCOUNTER
Pt's mom called and said she uses [facility] on [facility] listed on her chart in case something needs to be called in.

## 2021-12-22 NOTE — TELEPHONE ENCOUNTER
Spoke with patient, gave at home care instructions. Instructed to call scheduling dept for antibody infusion and call back if she cannot get through. Reviewed urgent symptoms and when to proceed to ED. She VU and agreed with plan.      - Stay home [address] [facility]

## 2021-12-22 NOTE — PROGRESS NOTES
Virtual/Telephone Check-In    Ramón Richter is a 27year old female here today for a telemedicine audio and video visit. HPI:       1.  COVID-19  -symptoms started 2 days ago  -tested positive for covid yesterday  -associated with cough, congestion, f DAYS THEN TAKE 2 TABLETS(1000 MG) BY MOUTH DAILY WITH BREAKFAST (Patient taking differently: Take 1,000 mg by mouth daily with breakfast.) 180 tablet 0   • Norgestimate-Eth Estradiol (SPRINTEC 28) 0.25-35 MG-MCG Oral Tab Take 1 tablet by mouth daily.  84 ta Lastly, the patient confirmed that they were in [place]. Included in this visit, time may have been spent reviewing labs, medications, radiology tests and decision making.  Appropriate medical decision-making and tests are ordered as detailed in the pl

## 2021-12-22 NOTE — TELEPHONE ENCOUNTER
Pt called and said she was here earlier to see Dr. Claudio Sharpe. She said she has heard of 2 different meds that can be taken for COVID and she would like to speak with a nurse.

## 2021-12-22 NOTE — TELEPHONE ENCOUNTER
Spoke with patient, gave at home care instructions again. Advised we are not prescribing any oral medications for the treatment of COVID at this time. Recommended she complete infusion as scheduled and follow up if symptoms worsen.     Reviewed urgent sympt

## 2021-12-22 NOTE — TELEPHONE ENCOUNTER
Pt called and said she had a video visit with Dr. Moi Hernández earlier today but he didn't tell her what the next step was. She wants to know if he will be ordering any medication to help her feel better.   If he is ordering anything it needs to be sent to [facility]

## 2021-12-22 NOTE — TELEPHONE ENCOUNTER
From: Mela Blankenship  To: Eliana Gresham MD  Sent: 12/22/2021 8:04 AM CST  Subject: Covid-19     Good morning Dr. Claudio Sharpe. I just recently tested positive for covid-19.  I was wondering if there is any medication or anything I should take in regards to being

## 2021-12-29 ENCOUNTER — PATIENT MESSAGE (OUTPATIENT)
Dept: FAMILY MEDICINE CLINIC | Facility: CLINIC | Age: 26
End: 2021-12-29

## 2021-12-29 NOTE — TELEPHONE ENCOUNTER
From: Jacqueline Brown  To: Desiree Avila MD  Sent: 12/29/2021 9:50 AM CST  Subject: Josias Killer Dr. Darshan Dennis,    I hope you are doing great before the new year comes.  I want to let you that Meera Folds been feeling a lot better and Meera Folds been not having

## 2022-01-04 ENCOUNTER — TELEMEDICINE (OUTPATIENT)
Dept: INTERNAL MEDICINE CLINIC | Facility: CLINIC | Age: 27
End: 2022-01-04

## 2022-01-04 DIAGNOSIS — E66.01 SEVERE OBESITY (HCC): ICD-10-CM

## 2022-01-04 DIAGNOSIS — Z51.81 ENCOUNTER FOR THERAPEUTIC DRUG MONITORING: Primary | ICD-10-CM

## 2022-01-04 DIAGNOSIS — R63.8 CRAVING FOR PARTICULAR FOOD: ICD-10-CM

## 2022-01-04 DIAGNOSIS — E66.9 OBESITY (BMI 30-39.9): ICD-10-CM

## 2022-01-04 PROCEDURE — 99213 OFFICE O/P EST LOW 20 MIN: CPT | Performed by: NURSE PRACTITIONER

## 2022-01-04 RX ORDER — TOPIRAMATE 50 MG/1
50 TABLET, FILM COATED ORAL 2 TIMES DAILY
Qty: 60 TABLET | Refills: 2 | Status: SHIPPED | OUTPATIENT
Start: 2022-01-04

## 2022-01-04 NOTE — PATIENT INSTRUCTIONS
Continue making lifestyle changes that focus on good nutrition, regular exercise and stress management. Medication Plan: Increase Topamax to 50 mg twice a day.  Vitamin D level mildly low- add an over the counter supplement of Vitamin D 2000 internationa sight.  3 – Find Healthy Substitutions  Feeling deprived of your favorite foods can make you miserable and resentful. Instead, experiment with healthier alternatives.  Here are some examples:  • Calorie-controlled frozen yogurt bars like [employer] vs high-calor Three ingredients necessary for making a habit of exercise for a lifetime includes: convenience, budget friendly and most importantly FUN! Changing up your exercise routine seasonally can keep you motivated and expose you to new interests and challenges! to monitor your heart rate. You can do this by wearing a fitness tracker, heart rate monitor or smart watch. You can also feel for your heartbeat and count it over a 15-second period.   • Low-intensity – An activity level you can continue for a long time (w serious injury. Build Muscle & Lose Fat  The great fat vs muscle diagram below paints a clear picture of why it’s so important for you to build muscle in order to lose fat.   Maybe Crystal Mendoza wondered about muscle vs fat and why you need to build muscle to l of confidence in your abilities to perform many lifestyle activities. 4. Prevent injury. Strength training can build stronger muscles and more limber, flexible joints, which play a crucial role in preventing injury. 5. Increase bone density.  Weight beari

## 2022-01-04 NOTE — PROGRESS NOTES
[facility] Weight Management follow up via video visit:    Subjective    This visit is conducted using Telemedicine with live, interactive video and audio.     Chief Complaint:  4 month follow up visit for lifestyle and medical management for overweigh for this visit:    Encounter for therapeutic drug monitoring  -     topiramate 50 MG Oral Tab; Take 1 tablet (50 mg total) by mouth 2 (two) times daily.     Severe obesity (HCC)  - increase Topamax  -  on fitness and nutrition  - reviewed labs, add V for success. How to Build a [facility] of Less-healthy Food  These foods are okay in moderation, but they can be enjoyed outside of the house in a fun and empowering way.  Go through your kitchen fridge, freezer, pantry, with nut butter, hummus and carrots, lunch meat and cheese roll-ups, etc. It may also help to keep pre-made freezer meals on-hand (something you've cooked in advance) for busy weeknights when you don't have time to prepare food.   Turning your home into a h exercise, consider these rajan factors. • What is your current fitness level? What are you able to do? • What is your schedule? How much time do you have to exercise? • What health and fitness goals do you want to achieve?   Build your plan off of the answ try exercises like hiking, swimming and biking. If you want to improve your muscle strength and you enjoy the convenience of the gym, try using free weights or machine weights.  You can also use your body weight for exercises like push-ups, chin-ups, planks combining muscle building weight training with cardiovascular exercise, gives you an unbeatable combination to lose fat and keep it off permanently. Of course it takes a few weeks before you see any measurable changes.  But you’ll start to build muscle, lo

## 2022-01-17 ENCOUNTER — OFFICE VISIT (OUTPATIENT)
Dept: INTERNAL MEDICINE CLINIC | Facility: CLINIC | Age: 27
End: 2022-01-17
Payer: COMMERCIAL

## 2022-01-17 VITALS — BODY MASS INDEX: 39.27 KG/M2 | HEIGHT: 63 IN | WEIGHT: 221.63 LBS

## 2022-01-17 DIAGNOSIS — E66.9 OBESITY (BMI 30-39.9): ICD-10-CM

## 2022-01-17 DIAGNOSIS — R63.8 CRAVING FOR PARTICULAR FOOD: ICD-10-CM

## 2022-01-17 DIAGNOSIS — E66.01 SEVERE OBESITY (HCC): ICD-10-CM

## 2022-01-17 PROCEDURE — 3008F BODY MASS INDEX DOCD: CPT

## 2022-01-17 PROCEDURE — 97802 MEDICAL NUTRITION INDIV IN: CPT

## 2022-01-17 NOTE — PROGRESS NOTES
INITIAL OUTPATIENT NUTRITION CONSULTATION    Nutrition Assessment    Medical Diagnosis: Obesity    Physical Findings: feeling well    Client Age and Gender: 27year old female    Marital Status and Occupation: lives with mom and step dad.   Works as [employer] Status   09/30/2021 134 (H) 13 - 56 U/L Final   02/17/2020 35 (H) 6 - 29 U/L Final         Height:  Ht Readings from Last 1 Encounters:  09/15/21 : 5' 3\" (1.6 m)      Weight:   Wt Readings from Last 2 Encounters:  09/15/21 : 224 lb 2 oz  07/28/21 : provided with ideas and recipe for incorporating more fruits and vegetables into each meal. Pt receptive to making plans for diet and exercise. Patient agreed to goals below. Goals:     • Keep a food record, My Net Diary.   • Continue to eat 4-6 meals/sn

## 2022-01-17 NOTE — PATIENT INSTRUCTIONS
Goals:     • Keep a food record, My Net Diary. • Continue to eat 4-6 meals/snacks per day. Include protein and produce at each time. Aim for 42-52 grams of protein per day. • Do 30 minutes of PA 5 days per week.   • Do strength training/resistance traini

## 2022-01-18 DIAGNOSIS — Z51.81 ENCOUNTER FOR THERAPEUTIC DRUG MONITORING: Primary | ICD-10-CM

## 2022-03-09 ENCOUNTER — OFFICE VISIT (OUTPATIENT)
Dept: INTERNAL MEDICINE CLINIC | Facility: CLINIC | Age: 27
End: 2022-03-09
Payer: COMMERCIAL

## 2022-03-09 VITALS
HEIGHT: 63 IN | WEIGHT: 217 LBS | OXYGEN SATURATION: 97 % | SYSTOLIC BLOOD PRESSURE: 110 MMHG | BODY MASS INDEX: 38.45 KG/M2 | RESPIRATION RATE: 18 BRPM | HEART RATE: 69 BPM | DIASTOLIC BLOOD PRESSURE: 80 MMHG

## 2022-03-09 DIAGNOSIS — Z51.81 ENCOUNTER FOR THERAPEUTIC DRUG MONITORING: Primary | ICD-10-CM

## 2022-03-09 DIAGNOSIS — E66.9 OBESITY (BMI 30-39.9): ICD-10-CM

## 2022-03-09 DIAGNOSIS — R63.8 CRAVING FOR PARTICULAR FOOD: ICD-10-CM

## 2022-03-09 DIAGNOSIS — K76.0 FATTY LIVER: ICD-10-CM

## 2022-03-09 PROCEDURE — 3074F SYST BP LT 130 MM HG: CPT | Performed by: NURSE PRACTITIONER

## 2022-03-09 PROCEDURE — 3079F DIAST BP 80-89 MM HG: CPT | Performed by: NURSE PRACTITIONER

## 2022-03-09 PROCEDURE — 99213 OFFICE O/P EST LOW 20 MIN: CPT | Performed by: NURSE PRACTITIONER

## 2022-03-09 PROCEDURE — 3008F BODY MASS INDEX DOCD: CPT | Performed by: NURSE PRACTITIONER

## 2022-03-10 RX ORDER — PHENTERMINE HYDROCHLORIDE 15 MG/1
15 CAPSULE ORAL EVERY MORNING
Qty: 30 CAPSULE | Refills: 1 | Status: SHIPPED | OUTPATIENT
Start: 2022-03-10

## 2022-03-22 ENCOUNTER — OFFICE VISIT (OUTPATIENT)
Dept: FAMILY MEDICINE CLINIC | Facility: CLINIC | Age: 27
End: 2022-03-22
Payer: COMMERCIAL

## 2022-03-22 VITALS
SYSTOLIC BLOOD PRESSURE: 100 MMHG | HEIGHT: 62.4 IN | DIASTOLIC BLOOD PRESSURE: 62 MMHG | WEIGHT: 217 LBS | TEMPERATURE: 97 F | HEART RATE: 72 BPM | BODY MASS INDEX: 39.43 KG/M2

## 2022-03-22 DIAGNOSIS — F41.9 ANXIETY: ICD-10-CM

## 2022-03-22 DIAGNOSIS — K76.0 FATTY LIVER: ICD-10-CM

## 2022-03-22 DIAGNOSIS — F33.1 MODERATE EPISODE OF RECURRENT MAJOR DEPRESSIVE DISORDER (HCC): ICD-10-CM

## 2022-03-22 DIAGNOSIS — Z13.29 SCREENING FOR ENDOCRINE, NUTRITIONAL, METABOLIC AND IMMUNITY DISORDER: ICD-10-CM

## 2022-03-22 DIAGNOSIS — Z01.419 WELL WOMAN EXAM: ICD-10-CM

## 2022-03-22 DIAGNOSIS — Z00.00 ROUTINE GENERAL MEDICAL EXAMINATION AT A HEALTH CARE FACILITY: Primary | ICD-10-CM

## 2022-03-22 DIAGNOSIS — E55.9 VITAMIN D DEFICIENCY: ICD-10-CM

## 2022-03-22 DIAGNOSIS — Z13.21 SCREENING FOR ENDOCRINE, NUTRITIONAL, METABOLIC AND IMMUNITY DISORDER: ICD-10-CM

## 2022-03-22 DIAGNOSIS — E78.2 MIXED HYPERLIPIDEMIA: ICD-10-CM

## 2022-03-22 DIAGNOSIS — Z13.228 SCREENING FOR ENDOCRINE, NUTRITIONAL, METABOLIC AND IMMUNITY DISORDER: ICD-10-CM

## 2022-03-22 DIAGNOSIS — Z13.0 SCREENING FOR ENDOCRINE, NUTRITIONAL, METABOLIC AND IMMUNITY DISORDER: ICD-10-CM

## 2022-03-22 DIAGNOSIS — E66.01 SEVERE OBESITY (HCC): ICD-10-CM

## 2022-03-22 DIAGNOSIS — Z13.6 SCREENING FOR CARDIOVASCULAR CONDITION: ICD-10-CM

## 2022-03-22 PROCEDURE — 99214 OFFICE O/P EST MOD 30 MIN: CPT | Performed by: FAMILY MEDICINE

## 2022-03-22 PROCEDURE — 3074F SYST BP LT 130 MM HG: CPT | Performed by: FAMILY MEDICINE

## 2022-03-22 PROCEDURE — 99395 PREV VISIT EST AGE 18-39: CPT | Performed by: FAMILY MEDICINE

## 2022-03-22 PROCEDURE — 3078F DIAST BP <80 MM HG: CPT | Performed by: FAMILY MEDICINE

## 2022-03-22 PROCEDURE — 3008F BODY MASS INDEX DOCD: CPT | Performed by: FAMILY MEDICINE

## 2022-03-22 NOTE — PATIENT INSTRUCTIONS
Schedule fasting bloodwork    Stick to healthier meals  Have mom cook more for you    Cut out soda, juice, gatorade, powerade    More exercise    Followup with nutritionist and weight loss clinic    Followup with me in 3 months          --------------------------------------------------------------------    If labs ordered:  -- schedule appt for fasting bloodwork anytime that you are able to (fast for 8-10 hours minimum, no food. Water is fine). -- go to [employer] or use [employer] to schedule [employer]  -- call [employer] or use website to schedule labs if your insurance prefers [facility] (Always confirm your preferred lab with your insurance, and let us know if you need labs ordered at a specific location)  -- we will call with results about 5-7 days after bloodwork is completed    Always verify coverage of any testing or specialist referral with your insurance    Work on healthy nutrition:  -focus on plant based, low-fat proteins  -limit fatty, red, or processed meats  -decrease carbohydrates (bread, rice, pasta, tortillas, sweets, sodas, juice, energy drinks)  -eat more fruits and veggies  -1/2 of every meal should be fruits/veggies; 1/4 should be protein, only 1/4 should be carbohydrates  -can work on decreasing portion sizes with each meal and drink plenty of water with each meal   -eat slowly - the brain can take up to 20min to realize stomach is full (easier to overeat when you eat fast)  -try to eat consistently throughout the day - can use healthy proteins or fiber rich foods for snacks in between meals (nuts, oatmeal, fruits, veggies)  -can you calorie tracking apps (myfitness pal or similar) to everything you eat for up to 2 wks to get a sense of what you are eating    Increase exercise:  -goal is 20-30min of continuous cardio (increased heart-rate and sweating) for 3-4 times per week  -work your way slowly up to this  -can focus on low impact exercises (elliptical, cycling, swimming) if you have joint pains with walking/jogging/running    For sleep:  -make sure room is dark and quiet  -no reading, tv, phone, tablets, computers in bed - these can activate the brain over time and associate being awake with being in the bed  -if you are not sleeping, leave the bedroom, do any of the above until you are tired, then try again  -this will help reinforce with the brain the the bed is for sleeping  -consider melatonin 5-6mg nightly for 4-6 wks to help with sleep  -can use OTC benadryl or unisom as needed on top of this    Skin health:  -always use sunscreen (30+ spf) if out in the sun for longer than 10-15min  -cover up if needed  -reapply sunscreen every 2 hours  -consider seeing a dermatologist for a full skin exam every 1-2 years if you have had a lot of sun exposure in your life or if you have a lot of moles

## 2022-03-23 RX ORDER — ESCITALOPRAM OXALATE 20 MG/1
20 TABLET ORAL DAILY
Qty: 90 TABLET | Refills: 0 | Status: SHIPPED | OUTPATIENT
Start: 2022-03-23

## 2022-03-23 RX ORDER — BUSPIRONE HYDROCHLORIDE 15 MG/1
15 TABLET ORAL DAILY
Qty: 90 TABLET | Refills: 0 | Status: SHIPPED | OUTPATIENT
Start: 2022-03-23

## 2022-03-23 RX ORDER — NORGESTIMATE AND ETHINYL ESTRADIOL 0.25-0.035
1 KIT ORAL DAILY
Qty: 84 TABLET | Refills: 0 | Status: SHIPPED | OUTPATIENT
Start: 2022-03-23

## 2022-03-23 RX ORDER — METFORMIN HYDROCHLORIDE 500 MG/1
1000 TABLET, EXTENDED RELEASE ORAL
Qty: 180 TABLET | Refills: 0 | Status: SHIPPED | OUTPATIENT
Start: 2022-03-23

## 2022-03-23 RX ORDER — TOPIRAMATE 50 MG/1
50 TABLET, FILM COATED ORAL 2 TIMES DAILY
Qty: 60 TABLET | Refills: 2 | Status: SHIPPED | OUTPATIENT
Start: 2022-03-23

## 2022-03-23 NOTE — TELEPHONE ENCOUNTER
Requesting topiramate 50 mg  LOV: 3/9/22  RTC: 2 months  Last Relevant Labs: na  Filled: 1/4/22 #60 with 2 refills    5/25/2022  4:20 PM WEN Zavala EM[facility]ITA [facility]     Will be due beginning of April.  Pended and sent to provider

## 2022-03-25 ENCOUNTER — LABORATORY ENCOUNTER (OUTPATIENT)
Dept: LAB | Facility: REFERENCE LAB | Age: 27
End: 2022-03-25
Attending: FAMILY MEDICINE
Payer: COMMERCIAL

## 2022-03-25 DIAGNOSIS — Z13.0 SCREENING FOR ENDOCRINE, NUTRITIONAL, METABOLIC AND IMMUNITY DISORDER: ICD-10-CM

## 2022-03-25 DIAGNOSIS — E78.2 MIXED HYPERLIPIDEMIA: ICD-10-CM

## 2022-03-25 DIAGNOSIS — Z13.228 SCREENING FOR ENDOCRINE, NUTRITIONAL, METABOLIC AND IMMUNITY DISORDER: ICD-10-CM

## 2022-03-25 DIAGNOSIS — Z13.21 SCREENING FOR ENDOCRINE, NUTRITIONAL, METABOLIC AND IMMUNITY DISORDER: ICD-10-CM

## 2022-03-25 DIAGNOSIS — Z13.6 SCREENING FOR CARDIOVASCULAR CONDITION: ICD-10-CM

## 2022-03-25 DIAGNOSIS — E55.9 VITAMIN D DEFICIENCY: ICD-10-CM

## 2022-03-25 DIAGNOSIS — Z13.29 SCREENING FOR ENDOCRINE, NUTRITIONAL, METABOLIC AND IMMUNITY DISORDER: ICD-10-CM

## 2022-03-25 LAB
ALBUMIN SERPL-MCNC: 3.5 G/DL (ref 3.4–5)
ALBUMIN/GLOB SERPL: 0.9 {RATIO} (ref 1–2)
ALP LIVER SERPL-CCNC: 81 U/L
ALT SERPL-CCNC: 45 U/L
ANION GAP SERPL CALC-SCNC: 8 MMOL/L (ref 0–18)
AST SERPL-CCNC: 19 U/L (ref 15–37)
BASOPHILS # BLD AUTO: 0.03 X10(3) UL (ref 0–0.2)
BASOPHILS NFR BLD AUTO: 0.4 %
BUN BLD-MCNC: 14 MG/DL (ref 7–18)
BUN/CREAT SERPL: 20.6 (ref 10–20)
CALCIUM BLD-MCNC: 9.1 MG/DL (ref 8.5–10.1)
CHLORIDE SERPL-SCNC: 110 MMOL/L (ref 98–112)
CHOLEST SERPL-MCNC: 219 MG/DL (ref ?–200)
CO2 SERPL-SCNC: 24 MMOL/L (ref 21–32)
CREAT BLD-MCNC: 0.68 MG/DL
DEPRECATED RDW RBC AUTO: 39.9 FL (ref 35.1–46.3)
EOSINOPHIL # BLD AUTO: 0.11 X10(3) UL (ref 0–0.7)
EOSINOPHIL NFR BLD AUTO: 1.4 %
ERYTHROCYTE [DISTWIDTH] IN BLOOD BY AUTOMATED COUNT: 11.6 % (ref 11–15)
FASTING PATIENT LIPID ANSWER: YES
FASTING STATUS PATIENT QL REPORTED: YES
GLOBULIN PLAS-MCNC: 4.1 G/DL (ref 2.8–4.4)
GLUCOSE BLD-MCNC: 87 MG/DL (ref 70–99)
HCT VFR BLD AUTO: 39.6 %
HDLC SERPL-MCNC: 49 MG/DL (ref 40–59)
HGB BLD-MCNC: 12.6 G/DL
IMM GRANULOCYTES # BLD AUTO: 0.04 X10(3) UL (ref 0–1)
IMM GRANULOCYTES NFR BLD: 0.5 %
LDLC SERPL CALC-MCNC: 142 MG/DL (ref ?–100)
LYMPHOCYTES # BLD AUTO: 1.97 X10(3) UL (ref 1–4)
LYMPHOCYTES NFR BLD AUTO: 25.7 %
MCH RBC QN AUTO: 30.1 PG (ref 26–34)
MCHC RBC AUTO-ENTMCNC: 31.8 G/DL (ref 31–37)
MCV RBC AUTO: 94.7 FL
MONOCYTES # BLD AUTO: 0.4 X10(3) UL (ref 0.1–1)
MONOCYTES NFR BLD AUTO: 5.2 %
NEUTROPHILS # BLD AUTO: 5.12 X10(3) UL (ref 1.5–7.7)
NEUTROPHILS NFR BLD AUTO: 66.8 %
NONHDLC SERPL-MCNC: 170 MG/DL (ref ?–130)
OSMOLALITY SERPL CALC.SUM OF ELEC: 294 MOSM/KG (ref 275–295)
PLATELET # BLD AUTO: 268 10(3)UL (ref 150–450)
POTASSIUM SERPL-SCNC: 4.2 MMOL/L (ref 3.5–5.1)
PROT SERPL-MCNC: 7.6 G/DL (ref 6.4–8.2)
RBC # BLD AUTO: 4.18 X10(6)UL
SODIUM SERPL-SCNC: 142 MMOL/L (ref 136–145)
TRIGL SERPL-MCNC: 154 MG/DL (ref 30–149)
TSI SER-ACNC: 1.5 MIU/ML (ref 0.36–3.74)
VIT D+METAB SERPL-MCNC: 15.9 NG/ML (ref 30–100)
VLDLC SERPL CALC-MCNC: 29 MG/DL (ref 0–30)
WBC # BLD AUTO: 7.7 X10(3) UL (ref 4–11)

## 2022-03-25 PROCEDURE — 80061 LIPID PANEL: CPT | Performed by: FAMILY MEDICINE

## 2022-03-25 PROCEDURE — 82306 VITAMIN D 25 HYDROXY: CPT | Performed by: FAMILY MEDICINE

## 2022-03-25 PROCEDURE — 80050 GENERAL HEALTH PANEL: CPT | Performed by: FAMILY MEDICINE

## 2022-03-25 RX ORDER — ERGOCALCIFEROL 1.25 MG/1
50000 CAPSULE ORAL WEEKLY
Qty: 12 CAPSULE | Refills: 0 | Status: SHIPPED | OUTPATIENT
Start: 2022-03-25 | End: 2022-06-17

## 2022-04-18 ENCOUNTER — OFFICE VISIT (OUTPATIENT)
Dept: INTERNAL MEDICINE CLINIC | Facility: CLINIC | Age: 27
End: 2022-04-18
Payer: COMMERCIAL

## 2022-04-18 VITALS — BODY MASS INDEX: 39 KG/M2 | HEIGHT: 62.4 IN | WEIGHT: 214.63 LBS

## 2022-04-18 DIAGNOSIS — E66.9 OBESITY (BMI 30-39.9): ICD-10-CM

## 2022-04-18 PROCEDURE — 97803 MED NUTRITION INDIV SUBSEQ: CPT

## 2022-04-18 PROCEDURE — 3008F BODY MASS INDEX DOCD: CPT

## 2022-05-23 ENCOUNTER — OFFICE VISIT (OUTPATIENT)
Dept: INTERNAL MEDICINE CLINIC | Facility: CLINIC | Age: 27
End: 2022-05-23
Payer: COMMERCIAL

## 2022-05-23 VITALS
RESPIRATION RATE: 16 BRPM | HEART RATE: 72 BPM | HEIGHT: 63 IN | DIASTOLIC BLOOD PRESSURE: 76 MMHG | WEIGHT: 214 LBS | SYSTOLIC BLOOD PRESSURE: 120 MMHG | BODY MASS INDEX: 37.92 KG/M2

## 2022-05-23 DIAGNOSIS — R63.8 CRAVING FOR PARTICULAR FOOD: ICD-10-CM

## 2022-05-23 DIAGNOSIS — E66.01 SEVERE OBESITY (HCC): ICD-10-CM

## 2022-05-23 DIAGNOSIS — Z51.81 ENCOUNTER FOR THERAPEUTIC DRUG MONITORING: Primary | ICD-10-CM

## 2022-05-23 DIAGNOSIS — E66.9 OBESITY (BMI 30-39.9): ICD-10-CM

## 2022-05-23 PROCEDURE — 3078F DIAST BP <80 MM HG: CPT | Performed by: NURSE PRACTITIONER

## 2022-05-23 PROCEDURE — 99213 OFFICE O/P EST LOW 20 MIN: CPT | Performed by: NURSE PRACTITIONER

## 2022-05-23 PROCEDURE — 3074F SYST BP LT 130 MM HG: CPT | Performed by: NURSE PRACTITIONER

## 2022-05-23 PROCEDURE — 3008F BODY MASS INDEX DOCD: CPT | Performed by: NURSE PRACTITIONER

## 2022-05-23 RX ORDER — PHENTERMINE HYDROCHLORIDE 15 MG/1
15 CAPSULE ORAL EVERY MORNING
Qty: 30 CAPSULE | Refills: 1 | Status: CANCELLED | OUTPATIENT
Start: 2022-05-23

## 2022-05-23 RX ORDER — PHENTERMINE HYDROCHLORIDE 37.5 MG/1
37.5 TABLET ORAL EVERY MORNING
Qty: 30 TABLET | Refills: 2 | Status: SHIPPED | OUTPATIENT
Start: 2022-05-23

## 2022-05-23 RX ORDER — TOPIRAMATE 50 MG/1
50 TABLET, FILM COATED ORAL 2 TIMES DAILY
Qty: 180 TABLET | Refills: 1 | Status: SHIPPED | OUTPATIENT
Start: 2022-05-23

## 2022-06-17 DIAGNOSIS — F33.1 MODERATE EPISODE OF RECURRENT MAJOR DEPRESSIVE DISORDER (HCC): ICD-10-CM

## 2022-06-17 DIAGNOSIS — Z51.81 ENCOUNTER FOR THERAPEUTIC DRUG MONITORING: ICD-10-CM

## 2022-06-17 DIAGNOSIS — F41.9 ANXIETY: ICD-10-CM

## 2022-06-17 RX ORDER — NORGESTIMATE AND ETHINYL ESTRADIOL 0.25-0.035
1 KIT ORAL DAILY
Qty: 84 TABLET | Refills: 0 | Status: SHIPPED | OUTPATIENT
Start: 2022-06-17

## 2022-06-17 RX ORDER — METFORMIN HYDROCHLORIDE 500 MG/1
1000 TABLET, EXTENDED RELEASE ORAL
Qty: 180 TABLET | Refills: 0 | Status: SHIPPED | OUTPATIENT
Start: 2022-06-17

## 2022-06-17 RX ORDER — ERGOCALCIFEROL 1.25 MG/1
50000 CAPSULE ORAL WEEKLY
Qty: 12 CAPSULE | Refills: 0 | OUTPATIENT
Start: 2022-06-17 | End: 2022-09-09

## 2022-06-17 RX ORDER — BUSPIRONE HYDROCHLORIDE 15 MG/1
15 TABLET ORAL DAILY
Qty: 90 TABLET | Refills: 0 | Status: SHIPPED | OUTPATIENT
Start: 2022-06-17

## 2022-06-17 RX ORDER — ESCITALOPRAM OXALATE 20 MG/1
20 TABLET ORAL DAILY
Qty: 90 TABLET | Refills: 0 | Status: SHIPPED | OUTPATIENT
Start: 2022-06-17

## 2022-07-06 ENCOUNTER — OFFICE VISIT (OUTPATIENT)
Dept: FAMILY MEDICINE CLINIC | Facility: CLINIC | Age: 27
End: 2022-07-06
Payer: COMMERCIAL

## 2022-07-06 VITALS
WEIGHT: 211 LBS | OXYGEN SATURATION: 97 % | SYSTOLIC BLOOD PRESSURE: 110 MMHG | HEART RATE: 90 BPM | BODY MASS INDEX: 38.34 KG/M2 | DIASTOLIC BLOOD PRESSURE: 70 MMHG | TEMPERATURE: 97 F | HEIGHT: 62.4 IN

## 2022-07-06 DIAGNOSIS — E55.9 VITAMIN D DEFICIENCY: ICD-10-CM

## 2022-07-06 DIAGNOSIS — F41.9 ANXIETY: ICD-10-CM

## 2022-07-06 DIAGNOSIS — K76.0 FATTY LIVER: ICD-10-CM

## 2022-07-06 DIAGNOSIS — E78.2 MIXED HYPERLIPIDEMIA: ICD-10-CM

## 2022-07-06 DIAGNOSIS — E66.01 SEVERE OBESITY (HCC): Primary | ICD-10-CM

## 2022-07-06 DIAGNOSIS — F33.1 MODERATE EPISODE OF RECURRENT MAJOR DEPRESSIVE DISORDER (HCC): ICD-10-CM

## 2022-07-06 PROCEDURE — 3074F SYST BP LT 130 MM HG: CPT | Performed by: FAMILY MEDICINE

## 2022-07-06 PROCEDURE — 3008F BODY MASS INDEX DOCD: CPT | Performed by: FAMILY MEDICINE

## 2022-07-06 PROCEDURE — 3078F DIAST BP <80 MM HG: CPT | Performed by: FAMILY MEDICINE

## 2022-07-06 PROCEDURE — 99214 OFFICE O/P EST MOD 30 MIN: CPT | Performed by: FAMILY MEDICINE

## 2022-07-06 NOTE — PATIENT INSTRUCTIONS
Continue to work on diet and exercise changes    Start Vit D 2000 units once daily    Recheck bloodwork before next appt    Followup in 3 months

## 2022-07-18 DIAGNOSIS — F33.1 MODERATE EPISODE OF RECURRENT MAJOR DEPRESSIVE DISORDER (HCC): ICD-10-CM

## 2022-07-18 DIAGNOSIS — F41.9 ANXIETY: ICD-10-CM

## 2022-07-19 RX ORDER — BUSPIRONE HYDROCHLORIDE 15 MG/1
15 TABLET ORAL DAILY
Qty: 90 TABLET | Refills: 0 | OUTPATIENT
Start: 2022-07-19

## 2022-08-28 DIAGNOSIS — E66.9 OBESITY (BMI 30-39.9): ICD-10-CM

## 2022-08-28 DIAGNOSIS — Z51.81 ENCOUNTER FOR THERAPEUTIC DRUG MONITORING: ICD-10-CM

## 2022-08-30 RX ORDER — PHENTERMINE HYDROCHLORIDE 37.5 MG/1
37.5 TABLET ORAL EVERY MORNING
Qty: 30 TABLET | Refills: 1 | Status: SHIPPED | OUTPATIENT
Start: 2022-08-30 | End: 2022-10-03

## 2022-08-30 RX ORDER — PHENTERMINE HYDROCHLORIDE 37.5 MG/1
TABLET ORAL
Qty: 30 TABLET | Refills: 0 | OUTPATIENT
Start: 2022-08-30

## 2022-08-31 RX ORDER — NORGESTIMATE AND ETHINYL ESTRADIOL 0.25-0.035
1 KIT ORAL DAILY
Qty: 84 TABLET | Refills: 0 | OUTPATIENT
Start: 2022-08-31

## 2022-08-31 RX ORDER — NORGESTIMATE AND ETHINYL ESTRADIOL 0.25-0.035
KIT ORAL
Qty: 84 TABLET | Refills: 0 | Status: SHIPPED | OUTPATIENT
Start: 2022-08-31

## 2022-09-12 DIAGNOSIS — Z51.81 ENCOUNTER FOR THERAPEUTIC DRUG MONITORING: ICD-10-CM

## 2022-09-12 DIAGNOSIS — F41.9 ANXIETY: ICD-10-CM

## 2022-09-14 ENCOUNTER — PATIENT MESSAGE (OUTPATIENT)
Dept: FAMILY MEDICINE CLINIC | Facility: CLINIC | Age: 27
End: 2022-09-14

## 2022-09-14 RX ORDER — NORGESTIMATE AND ETHINYL ESTRADIOL 0.25-0.035
KIT ORAL
Qty: 28 TABLET | Refills: 0 | Status: SHIPPED | OUTPATIENT
Start: 2022-09-14

## 2022-09-14 RX ORDER — METFORMIN HYDROCHLORIDE 500 MG/1
TABLET, EXTENDED RELEASE ORAL
Qty: 180 TABLET | Refills: 0 | Status: SHIPPED | OUTPATIENT
Start: 2022-09-14

## 2022-09-14 RX ORDER — ESCITALOPRAM OXALATE 20 MG/1
TABLET ORAL
Qty: 90 TABLET | Refills: 0 | Status: SHIPPED | OUTPATIENT
Start: 2022-09-14

## 2022-10-03 ENCOUNTER — OFFICE VISIT (OUTPATIENT)
Dept: INTERNAL MEDICINE CLINIC | Facility: CLINIC | Age: 27
End: 2022-10-03
Payer: COMMERCIAL

## 2022-10-03 VITALS
DIASTOLIC BLOOD PRESSURE: 84 MMHG | HEART RATE: 91 BPM | RESPIRATION RATE: 16 BRPM | OXYGEN SATURATION: 99 % | HEIGHT: 63 IN | WEIGHT: 204 LBS | BODY MASS INDEX: 36.14 KG/M2 | SYSTOLIC BLOOD PRESSURE: 122 MMHG

## 2022-10-03 DIAGNOSIS — K76.0 FATTY LIVER: ICD-10-CM

## 2022-10-03 DIAGNOSIS — E66.01 SEVERE OBESITY (HCC): ICD-10-CM

## 2022-10-03 DIAGNOSIS — E66.9 OBESITY (BMI 30-39.9): ICD-10-CM

## 2022-10-03 DIAGNOSIS — Z51.81 ENCOUNTER FOR THERAPEUTIC DRUG MONITORING: Primary | ICD-10-CM

## 2022-10-03 DIAGNOSIS — E88.81 INSULIN RESISTANCE: ICD-10-CM

## 2022-10-03 DIAGNOSIS — R63.8 CRAVING FOR PARTICULAR FOOD: ICD-10-CM

## 2022-10-03 PROCEDURE — 99213 OFFICE O/P EST LOW 20 MIN: CPT | Performed by: NURSE PRACTITIONER

## 2022-10-03 PROCEDURE — 3008F BODY MASS INDEX DOCD: CPT | Performed by: NURSE PRACTITIONER

## 2022-10-03 PROCEDURE — 3079F DIAST BP 80-89 MM HG: CPT | Performed by: NURSE PRACTITIONER

## 2022-10-03 PROCEDURE — 3074F SYST BP LT 130 MM HG: CPT | Performed by: NURSE PRACTITIONER

## 2022-10-03 RX ORDER — METFORMIN HYDROCHLORIDE 750 MG/1
1500 TABLET, EXTENDED RELEASE ORAL
Qty: 180 TABLET | Refills: 1 | Status: SHIPPED | OUTPATIENT
Start: 2022-10-03

## 2022-10-03 RX ORDER — PHENTERMINE HYDROCHLORIDE 37.5 MG/1
37.5 TABLET ORAL EVERY MORNING
Qty: 30 TABLET | Refills: 2 | Status: SHIPPED | OUTPATIENT
Start: 2022-10-03

## 2022-10-03 RX ORDER — TOPIRAMATE 100 MG/1
100 TABLET, FILM COATED ORAL 2 TIMES DAILY
Qty: 180 TABLET | Refills: 1 | Status: SHIPPED | OUTPATIENT
Start: 2022-10-03

## 2022-10-31 DIAGNOSIS — F41.9 ANXIETY: ICD-10-CM

## 2022-10-31 DIAGNOSIS — F33.1 MODERATE EPISODE OF RECURRENT MAJOR DEPRESSIVE DISORDER (HCC): ICD-10-CM

## 2022-11-01 ENCOUNTER — LAB ENCOUNTER (OUTPATIENT)
Dept: LAB | Age: 27
End: 2022-11-01
Attending: FAMILY MEDICINE
Payer: COMMERCIAL

## 2022-11-01 DIAGNOSIS — E55.9 VITAMIN D DEFICIENCY: ICD-10-CM

## 2022-11-01 DIAGNOSIS — E66.9 OBESITY (BMI 30-39.9): ICD-10-CM

## 2022-11-01 DIAGNOSIS — K76.0 FATTY LIVER: ICD-10-CM

## 2022-11-01 DIAGNOSIS — Z51.81 ENCOUNTER FOR THERAPEUTIC DRUG MONITORING: ICD-10-CM

## 2022-11-01 DIAGNOSIS — E66.01 SEVERE OBESITY (HCC): ICD-10-CM

## 2022-11-01 DIAGNOSIS — E78.2 MIXED HYPERLIPIDEMIA: ICD-10-CM

## 2022-11-01 LAB
ALBUMIN SERPL-MCNC: 3.5 G/DL (ref 3.4–5)
ALBUMIN/GLOB SERPL: 0.8 {RATIO} (ref 1–2)
ALP LIVER SERPL-CCNC: 76 U/L
ALT SERPL-CCNC: 38 U/L
ANION GAP SERPL CALC-SCNC: 8 MMOL/L (ref 0–18)
AST SERPL-CCNC: 20 U/L (ref 15–37)
BILIRUB SERPL-MCNC: 0.4 MG/DL (ref 0.1–2)
BUN BLD-MCNC: 11 MG/DL (ref 7–18)
CALCIUM BLD-MCNC: 9.1 MG/DL (ref 8.5–10.1)
CHLORIDE SERPL-SCNC: 109 MMOL/L (ref 98–112)
CHOLEST SERPL-MCNC: 208 MG/DL (ref ?–200)
CO2 SERPL-SCNC: 23 MMOL/L (ref 21–32)
CREAT BLD-MCNC: 0.77 MG/DL
FASTING PATIENT LIPID ANSWER: YES
FASTING STATUS PATIENT QL REPORTED: YES
GFR SERPLBLD BASED ON 1.73 SQ M-ARVRAT: 107 ML/MIN/1.73M2 (ref 60–?)
GLOBULIN PLAS-MCNC: 4.4 G/DL (ref 2.8–4.4)
GLUCOSE BLD-MCNC: 86 MG/DL (ref 70–99)
HDLC SERPL-MCNC: 50 MG/DL (ref 40–59)
INSULIN SERPL-ACNC: 11.3 MU/L (ref 3–25)
LDLC SERPL CALC-MCNC: 129 MG/DL (ref ?–100)
NONHDLC SERPL-MCNC: 158 MG/DL (ref ?–130)
OSMOLALITY SERPL CALC.SUM OF ELEC: 289 MOSM/KG (ref 275–295)
POTASSIUM SERPL-SCNC: 3.9 MMOL/L (ref 3.5–5.1)
PROT SERPL-MCNC: 7.9 G/DL (ref 6.4–8.2)
SODIUM SERPL-SCNC: 140 MMOL/L (ref 136–145)
TRIGL SERPL-MCNC: 162 MG/DL (ref 30–149)
VIT D+METAB SERPL-MCNC: 30.4 NG/ML (ref 30–100)
VLDLC SERPL CALC-MCNC: 29 MG/DL (ref 0–30)

## 2022-11-01 PROCEDURE — 80061 LIPID PANEL: CPT | Performed by: FAMILY MEDICINE

## 2022-11-01 PROCEDURE — 83525 ASSAY OF INSULIN: CPT | Performed by: NURSE PRACTITIONER

## 2022-11-01 PROCEDURE — 80053 COMPREHEN METABOLIC PANEL: CPT | Performed by: FAMILY MEDICINE

## 2022-11-01 PROCEDURE — 82306 VITAMIN D 25 HYDROXY: CPT | Performed by: FAMILY MEDICINE

## 2022-11-01 RX ORDER — BUSPIRONE HYDROCHLORIDE 15 MG/1
TABLET ORAL
Qty: 90 TABLET | Refills: 0 | Status: SHIPPED | OUTPATIENT
Start: 2022-11-01

## 2022-11-21 ENCOUNTER — PATIENT MESSAGE (OUTPATIENT)
Dept: FAMILY MEDICINE CLINIC | Facility: CLINIC | Age: 27
End: 2022-11-21

## 2022-11-21 DIAGNOSIS — F33.1 MODERATE EPISODE OF RECURRENT MAJOR DEPRESSIVE DISORDER (HCC): ICD-10-CM

## 2022-11-21 DIAGNOSIS — E66.01 SEVERE OBESITY (HCC): ICD-10-CM

## 2022-11-21 DIAGNOSIS — Z51.81 ENCOUNTER FOR THERAPEUTIC DRUG MONITORING: ICD-10-CM

## 2022-11-21 DIAGNOSIS — F41.9 ANXIETY: ICD-10-CM

## 2022-11-21 DIAGNOSIS — E66.9 OBESITY (BMI 30-39.9): ICD-10-CM

## 2022-11-21 DIAGNOSIS — K76.0 FATTY LIVER: ICD-10-CM

## 2022-11-21 DIAGNOSIS — R63.8 CRAVING FOR PARTICULAR FOOD: ICD-10-CM

## 2022-11-22 RX ORDER — ESCITALOPRAM OXALATE 20 MG/1
20 TABLET ORAL DAILY
Qty: 90 TABLET | Refills: 0 | OUTPATIENT
Start: 2022-11-22

## 2022-11-22 RX ORDER — NORGESTIMATE AND ETHINYL ESTRADIOL 0.25-0.035
1 KIT ORAL DAILY
Qty: 84 TABLET | Refills: 1 | OUTPATIENT
Start: 2022-11-22

## 2022-11-22 RX ORDER — BUSPIRONE HYDROCHLORIDE 15 MG/1
15 TABLET ORAL DAILY
Qty: 90 TABLET | Refills: 0 | OUTPATIENT
Start: 2022-11-22

## 2022-11-22 NOTE — TELEPHONE ENCOUNTER
From: Umm Louis  To: Kathia Connell MD  Sent: 11/21/2022 8:15 PM CST  Subject: Medication Renewal    Hi Dr. Moreno Hutchinson,    I sent out a medication renewal to you and somehow it went out to Dr. Laurie Forbes as well. The last one you sent from our last follow up never went through with my current insurance. I know with the holiday coming up, can we try to see if [place] can fill all of my prescriptions for 3 months including the ones from Dr. Laurie Forbes? Please let me know what you need from me. I hope to hear from you soon! Thank you!

## 2022-11-22 NOTE — TELEPHONE ENCOUNTER
Patient requesting new fills be sent to [facility] for 90 days d/t losing insurance. She also says you were ok filling scripts normally filled by [facility]. I did pend scripts to [facility] for 90 days.

## 2022-11-23 RX ORDER — PHENTERMINE HYDROCHLORIDE 37.5 MG/1
37.5 TABLET ORAL EVERY MORNING
Qty: 30 TABLET | Refills: 2 | Status: SHIPPED | OUTPATIENT
Start: 2022-11-23

## 2022-11-23 RX ORDER — ESCITALOPRAM OXALATE 20 MG/1
20 TABLET ORAL DAILY
Qty: 90 TABLET | Refills: 0 | Status: SHIPPED | OUTPATIENT
Start: 2022-11-23

## 2022-11-23 RX ORDER — TOPIRAMATE 100 MG/1
100 TABLET, FILM COATED ORAL 2 TIMES DAILY
Qty: 180 TABLET | Refills: 1 | Status: SHIPPED | OUTPATIENT
Start: 2022-11-23

## 2022-11-23 RX ORDER — NORGESTIMATE AND ETHINYL ESTRADIOL 0.25-0.035
1 KIT ORAL DAILY
Qty: 84 TABLET | Refills: 1 | Status: SHIPPED | OUTPATIENT
Start: 2022-11-23

## 2022-11-23 RX ORDER — BUSPIRONE HYDROCHLORIDE 15 MG/1
15 TABLET ORAL DAILY
Qty: 90 TABLET | Refills: 0 | Status: SHIPPED | OUTPATIENT
Start: 2022-11-23

## 2022-11-23 RX ORDER — METFORMIN HYDROCHLORIDE 750 MG/1
1500 TABLET, EXTENDED RELEASE ORAL
Qty: 180 TABLET | Refills: 1 | Status: SHIPPED | OUTPATIENT
Start: 2022-11-23

## 2022-12-23 DIAGNOSIS — E66.01 SEVERE OBESITY (HCC): ICD-10-CM

## 2022-12-23 DIAGNOSIS — Z51.81 ENCOUNTER FOR THERAPEUTIC DRUG MONITORING: ICD-10-CM

## 2022-12-23 DIAGNOSIS — E66.9 OBESITY (BMI 30-39.9): ICD-10-CM

## 2022-12-27 RX ORDER — PHENTERMINE HYDROCHLORIDE 37.5 MG/1
37.5 TABLET ORAL EVERY MORNING
Qty: 30 TABLET | Refills: 2 | OUTPATIENT
Start: 2022-12-27

## 2023-02-06 ENCOUNTER — PATIENT MESSAGE (OUTPATIENT)
Dept: INTERNAL MEDICINE CLINIC | Facility: CLINIC | Age: 28
End: 2023-02-06

## 2023-02-06 DIAGNOSIS — E66.01 SEVERE OBESITY (HCC): ICD-10-CM

## 2023-02-06 DIAGNOSIS — E66.9 OBESITY (BMI 30-39.9): ICD-10-CM

## 2023-02-06 DIAGNOSIS — Z51.81 ENCOUNTER FOR THERAPEUTIC DRUG MONITORING: ICD-10-CM

## 2023-02-09 RX ORDER — PHENTERMINE HYDROCHLORIDE 37.5 MG/1
37.5 TABLET ORAL EVERY MORNING
Qty: 30 TABLET | Refills: 2 | Status: SHIPPED | OUTPATIENT
Start: 2023-02-09

## 2023-03-16 DIAGNOSIS — F33.1 MODERATE EPISODE OF RECURRENT MAJOR DEPRESSIVE DISORDER (HCC): ICD-10-CM

## 2023-03-16 DIAGNOSIS — Z51.81 ENCOUNTER FOR THERAPEUTIC DRUG MONITORING: ICD-10-CM

## 2023-03-16 DIAGNOSIS — F41.9 ANXIETY: ICD-10-CM

## 2023-03-16 RX ORDER — NORGESTIMATE AND ETHINYL ESTRADIOL 0.25-0.035
KIT ORAL
Qty: 84 TABLET | Refills: 1 | OUTPATIENT
Start: 2023-03-16

## 2023-03-16 RX ORDER — BUSPIRONE HYDROCHLORIDE 15 MG/1
TABLET ORAL
Qty: 90 TABLET | Refills: 0 | OUTPATIENT
Start: 2023-03-16

## 2023-04-05 ENCOUNTER — TELEPHONE (OUTPATIENT)
Dept: FAMILY MEDICINE CLINIC | Facility: CLINIC | Age: 28
End: 2023-04-05

## 2023-04-05 NOTE — TELEPHONE ENCOUNTER
Pt called and is requesting if we can fax form to her job and send a copy to her by mail.      FAX: [phone]  ATTEN: Amanda Holloway

## 2023-04-05 NOTE — TELEPHONE ENCOUNTER
Patient states she needs her accomodation form filled asap she brought form on 3/15/23 and has not heard anything back. She forgot to mention it to Dr. Hilda Jacobs yesterday's appointment. Patient would [facility]. Please call when form is complete.      Thank you

## 2023-05-02 ENCOUNTER — TELEPHONE (OUTPATIENT)
Dept: FAMILY MEDICINE CLINIC | Facility: CLINIC | Age: 28
End: 2023-05-02

## 2023-05-02 DIAGNOSIS — Z51.81 ENCOUNTER FOR THERAPEUTIC DRUG MONITORING: ICD-10-CM

## 2023-05-02 NOTE — TELEPHONE ENCOUNTER
Pt called and stated that she has a possible sinus infection. States symptoms started about 5 days ago, headache, stuffy and runny nose, cough and sore throat.  Pt is requesting advice

## 2023-05-02 NOTE — TELEPHONE ENCOUNTER
Patient c/o congestion, sore throat x 5 days. Also had cough but this has resolved. Denied fever, shortness of breath or any other symptom. Advised on supportive care including plenty of water, rest, OTC products including mucinex, flonase, zyrtec. Continue to monitor, call office in a few days if no better, [facility] IC or WIC.  Verbalized understanding

## 2023-05-03 NOTE — TELEPHONE ENCOUNTER
Patient would like a note for work to be excused rest of week not feeling any better.     please fax:    Chalo Fish: [phone] [address] [facility]

## 2023-05-04 DIAGNOSIS — F41.9 ANXIETY: ICD-10-CM

## 2023-05-04 DIAGNOSIS — F33.1 MODERATE EPISODE OF RECURRENT MAJOR DEPRESSIVE DISORDER (HCC): ICD-10-CM

## 2023-05-04 RX ORDER — NORGESTIMATE AND ETHINYL ESTRADIOL 0.25-0.035
KIT ORAL
Qty: 84 TABLET | Refills: 1 | OUTPATIENT
Start: 2023-05-04

## 2023-05-05 RX ORDER — BUSPIRONE HYDROCHLORIDE 15 MG/1
15 TABLET ORAL 2 TIMES DAILY
Qty: 180 TABLET | Refills: 1 | Status: SHIPPED | OUTPATIENT
Start: 2023-05-05

## 2023-05-25 DIAGNOSIS — E66.01 SEVERE OBESITY (HCC): ICD-10-CM

## 2023-05-25 DIAGNOSIS — E66.9 OBESITY (BMI 30-39.9): ICD-10-CM

## 2023-05-25 DIAGNOSIS — Z51.81 ENCOUNTER FOR THERAPEUTIC DRUG MONITORING: ICD-10-CM

## 2023-05-26 RX ORDER — PHENTERMINE HYDROCHLORIDE 37.5 MG/1
37.5 TABLET ORAL EVERY MORNING
Qty: 30 TABLET | Refills: 2 | OUTPATIENT
Start: 2023-05-26

## 2023-05-30 DIAGNOSIS — E66.01 SEVERE OBESITY (HCC): ICD-10-CM

## 2023-05-30 DIAGNOSIS — Z51.81 ENCOUNTER FOR THERAPEUTIC DRUG MONITORING: ICD-10-CM

## 2023-05-30 DIAGNOSIS — E66.9 OBESITY (BMI 30-39.9): ICD-10-CM

## 2023-05-30 RX ORDER — PHENTERMINE HYDROCHLORIDE 37.5 MG/1
TABLET ORAL
Qty: 30 TABLET | Refills: 0 | OUTPATIENT
Start: 2023-05-30

## 2023-08-30 DIAGNOSIS — E66.01 SEVERE OBESITY (HCC): ICD-10-CM

## 2023-08-30 DIAGNOSIS — F41.9 ANXIETY: ICD-10-CM

## 2023-08-30 DIAGNOSIS — Z51.81 ENCOUNTER FOR THERAPEUTIC DRUG MONITORING: ICD-10-CM

## 2023-08-30 DIAGNOSIS — E66.9 OBESITY (BMI 30-39.9): ICD-10-CM

## 2023-08-30 DIAGNOSIS — K76.0 FATTY LIVER: ICD-10-CM

## 2023-08-31 RX ORDER — ESCITALOPRAM OXALATE 20 MG/1
20 TABLET ORAL DAILY
Qty: 90 TABLET | Refills: 1 | Status: SHIPPED | OUTPATIENT
Start: 2023-08-31

## 2023-08-31 RX ORDER — METFORMIN HYDROCHLORIDE 750 MG/1
1500 TABLET, EXTENDED RELEASE ORAL
Qty: 180 TABLET | Refills: 0 | Status: SHIPPED | OUTPATIENT
Start: 2023-08-31

## 2023-09-01 DIAGNOSIS — R63.8 CRAVING FOR PARTICULAR FOOD: ICD-10-CM

## 2023-09-01 DIAGNOSIS — Z51.81 ENCOUNTER FOR THERAPEUTIC DRUG MONITORING: ICD-10-CM

## 2023-09-01 DIAGNOSIS — E66.9 OBESITY (BMI 30-39.9): ICD-10-CM

## 2023-09-01 DIAGNOSIS — E66.01 SEVERE OBESITY (HCC): ICD-10-CM

## 2023-09-05 RX ORDER — TOPIRAMATE 100 MG/1
100 TABLET, FILM COATED ORAL 2 TIMES DAILY
Qty: 180 TABLET | Refills: 0 | OUTPATIENT
Start: 2023-09-05

## 2023-09-05 RX ORDER — PHENTERMINE HYDROCHLORIDE 37.5 MG/1
37.5 TABLET ORAL EVERY MORNING
Qty: 30 TABLET | Refills: 2 | OUTPATIENT
Start: 2023-09-05

## 2023-09-05 NOTE — TELEPHONE ENCOUNTER
Requesting   Requested Prescriptions     Pending Prescriptions Disp Refills    Phentermine HCl 37.5 MG Oral Tab 30 tablet 2     Sig: Take 1 tablet (37.5 mg total) by mouth every morning. topiramate 100 MG Oral Tab 180 tablet 0     Sig: Take 1 tablet (100 mg total) by mouth 2 (two) times daily.        LOV: 10/3/23  RTC: 2-3 months  Filled: phen 2/9/23 #30 with 2 refills  Topira 5/7/23 #180 with 0 refills    Future Appointments   Date Time Provider Adan Woo   9/25/2023  6:30 PM Crista Fitzpatrick MD [facility] [place]   10/18/2023  5:40 PM WEN Perez [facility]     Needs to wait till appt

## 2023-09-25 ENCOUNTER — OFFICE VISIT (OUTPATIENT)
Dept: FAMILY MEDICINE CLINIC | Facility: CLINIC | Age: 28
End: 2023-09-25
Payer: COMMERCIAL

## 2023-09-25 VITALS
DIASTOLIC BLOOD PRESSURE: 60 MMHG | WEIGHT: 215 LBS | HEIGHT: 62.21 IN | SYSTOLIC BLOOD PRESSURE: 110 MMHG | TEMPERATURE: 97 F | BODY MASS INDEX: 39.06 KG/M2 | HEART RATE: 76 BPM | OXYGEN SATURATION: 98 %

## 2023-09-25 DIAGNOSIS — Z51.81 ENCOUNTER FOR THERAPEUTIC DRUG MONITORING: ICD-10-CM

## 2023-09-25 DIAGNOSIS — E66.9 OBESITY (BMI 30-39.9): ICD-10-CM

## 2023-09-25 DIAGNOSIS — E78.2 MIXED HYPERLIPIDEMIA: ICD-10-CM

## 2023-09-25 DIAGNOSIS — F33.1 MODERATE EPISODE OF RECURRENT MAJOR DEPRESSIVE DISORDER (HCC): ICD-10-CM

## 2023-09-25 DIAGNOSIS — Z00.00 ROUTINE GENERAL MEDICAL EXAMINATION AT A HEALTH CARE FACILITY: Primary | ICD-10-CM

## 2023-09-25 DIAGNOSIS — E66.01 SEVERE OBESITY (HCC): ICD-10-CM

## 2023-09-25 DIAGNOSIS — K76.0 FATTY LIVER: ICD-10-CM

## 2023-09-25 RX ORDER — TOPIRAMATE 50 MG/1
TABLET, FILM COATED ORAL
Qty: 60 TABLET | Refills: 1 | Status: SHIPPED | OUTPATIENT
Start: 2023-09-25 | End: 2024-01-07

## 2023-09-25 RX ORDER — PHENTERMINE HYDROCHLORIDE 37.5 MG/1
37.5 TABLET ORAL EVERY MORNING
Qty: 30 TABLET | Refills: 2 | Status: SHIPPED | OUTPATIENT
Start: 2023-09-25

## 2023-09-26 NOTE — PATIENT INSTRUCTIONS
Continue all meds as prescribed    Continue escitalopram (lexapro) once daily    Continue buspirone 2x/day    Restart phentermine every morning    Restart topamax and slowly increase the dose as tolerated    [facility]    Continue to eat healthy and exercise    Followup in 3 months, sooner if needed

## 2023-10-09 ENCOUNTER — LAB ENCOUNTER (OUTPATIENT)
Dept: LAB | Facility: REFERENCE LAB | Age: 28
End: 2023-10-09
Attending: FAMILY MEDICINE
Payer: COMMERCIAL

## 2023-10-09 DIAGNOSIS — Z00.00 ROUTINE GENERAL MEDICAL EXAMINATION AT A HEALTH CARE FACILITY: ICD-10-CM

## 2023-10-09 LAB
ALBUMIN SERPL-MCNC: 3.2 G/DL (ref 3.4–5)
ALBUMIN/GLOB SERPL: 0.6 {RATIO} (ref 1–2)
ALP LIVER SERPL-CCNC: 78 U/L
ALT SERPL-CCNC: 26 U/L
ANION GAP SERPL CALC-SCNC: 8 MMOL/L (ref 0–18)
AST SERPL-CCNC: 15 U/L (ref 15–37)
BASOPHILS # BLD AUTO: 0.05 X10(3) UL (ref 0–0.2)
BASOPHILS NFR BLD AUTO: 0.6 %
BILIRUB SERPL-MCNC: 0.4 MG/DL (ref 0.1–2)
BUN BLD-MCNC: 9 MG/DL (ref 7–18)
BUN/CREAT SERPL: 11 (ref 10–20)
CALCIUM BLD-MCNC: 8.7 MG/DL (ref 8.5–10.1)
CHLORIDE SERPL-SCNC: 109 MMOL/L (ref 98–112)
CHOLEST SERPL-MCNC: 224 MG/DL (ref ?–200)
CO2 SERPL-SCNC: 25 MMOL/L (ref 21–32)
CREAT BLD-MCNC: 0.82 MG/DL
DEPRECATED RDW RBC AUTO: 38.8 FL (ref 35.1–46.3)
EGFRCR SERPLBLD CKD-EPI 2021: 99 ML/MIN/1.73M2 (ref 60–?)
EOSINOPHIL # BLD AUTO: 0.09 X10(3) UL (ref 0–0.7)
EOSINOPHIL NFR BLD AUTO: 1.2 %
ERYTHROCYTE [DISTWIDTH] IN BLOOD BY AUTOMATED COUNT: 11.5 % (ref 11–15)
FASTING PATIENT LIPID ANSWER: YES
FASTING STATUS PATIENT QL REPORTED: YES
GLOBULIN PLAS-MCNC: 5 G/DL (ref 2.8–4.4)
GLUCOSE BLD-MCNC: 97 MG/DL (ref 70–99)
HCT VFR BLD AUTO: 39.5 %
HDLC SERPL-MCNC: 61 MG/DL (ref 40–59)
HGB BLD-MCNC: 13 G/DL
IMM GRANULOCYTES # BLD AUTO: 0.02 X10(3) UL (ref 0–1)
IMM GRANULOCYTES NFR BLD: 0.3 %
LDLC SERPL CALC-MCNC: 139 MG/DL (ref ?–100)
LYMPHOCYTES # BLD AUTO: 2.23 X10(3) UL (ref 1–4)
LYMPHOCYTES NFR BLD AUTO: 28.9 %
MCH RBC QN AUTO: 30.3 PG (ref 26–34)
MCHC RBC AUTO-ENTMCNC: 32.9 G/DL (ref 31–37)
MCV RBC AUTO: 92.1 FL
MONOCYTES # BLD AUTO: 0.35 X10(3) UL (ref 0.1–1)
MONOCYTES NFR BLD AUTO: 4.5 %
NEUTROPHILS # BLD AUTO: 4.97 X10 (3) UL (ref 1.5–7.7)
NEUTROPHILS # BLD AUTO: 4.97 X10(3) UL (ref 1.5–7.7)
NEUTROPHILS NFR BLD AUTO: 64.5 %
NONHDLC SERPL-MCNC: 163 MG/DL (ref ?–130)
OSMOLALITY SERPL CALC.SUM OF ELEC: 293 MOSM/KG (ref 275–295)
PLATELET # BLD AUTO: 293 10(3)UL (ref 150–450)
POTASSIUM SERPL-SCNC: 3.8 MMOL/L (ref 3.5–5.1)
PROT SERPL-MCNC: 8.2 G/DL (ref 6.4–8.2)
RBC # BLD AUTO: 4.29 X10(6)UL
SODIUM SERPL-SCNC: 142 MMOL/L (ref 136–145)
TRIGL SERPL-MCNC: 136 MG/DL (ref 30–149)
TSI SER-ACNC: 1.61 MIU/ML (ref 0.36–3.74)
VLDLC SERPL CALC-MCNC: 25 MG/DL (ref 0–30)
WBC # BLD AUTO: 7.7 X10(3) UL (ref 4–11)

## 2023-10-09 PROCEDURE — 80061 LIPID PANEL: CPT | Performed by: FAMILY MEDICINE

## 2023-10-09 PROCEDURE — 80050 GENERAL HEALTH PANEL: CPT | Performed by: FAMILY MEDICINE

## 2023-10-18 ENCOUNTER — OFFICE VISIT (OUTPATIENT)
Dept: INTERNAL MEDICINE CLINIC | Facility: CLINIC | Age: 28
End: 2023-10-18
Payer: COMMERCIAL

## 2023-10-18 VITALS
WEIGHT: 214 LBS | DIASTOLIC BLOOD PRESSURE: 82 MMHG | HEIGHT: 63 IN | RESPIRATION RATE: 18 BRPM | SYSTOLIC BLOOD PRESSURE: 118 MMHG | HEART RATE: 80 BPM | BODY MASS INDEX: 37.92 KG/M2

## 2023-10-18 DIAGNOSIS — R63.8 CRAVING FOR PARTICULAR FOOD: ICD-10-CM

## 2023-10-18 DIAGNOSIS — Z51.81 ENCOUNTER FOR THERAPEUTIC DRUG MONITORING: Primary | ICD-10-CM

## 2023-10-18 DIAGNOSIS — E66.01 CLASS 2 SEVERE OBESITY WITH SERIOUS COMORBIDITY AND BODY MASS INDEX (BMI) OF 37.0 TO 37.9 IN ADULT, UNSPECIFIED OBESITY TYPE: ICD-10-CM

## 2023-10-18 DIAGNOSIS — K76.0 FATTY LIVER: ICD-10-CM

## 2023-10-18 DIAGNOSIS — E88.819 INSULIN RESISTANCE: ICD-10-CM

## 2023-10-18 PROBLEM — E66.812 CLASS 2 SEVERE OBESITY WITH SERIOUS COMORBIDITY AND BODY MASS INDEX (BMI) OF 37.0 TO 37.9 IN ADULT (HCC): Status: ACTIVE | Noted: 2023-10-18

## 2023-10-18 PROCEDURE — 3074F SYST BP LT 130 MM HG: CPT | Performed by: NURSE PRACTITIONER

## 2023-10-18 PROCEDURE — 99214 OFFICE O/P EST MOD 30 MIN: CPT | Performed by: NURSE PRACTITIONER

## 2023-10-18 PROCEDURE — 3079F DIAST BP 80-89 MM HG: CPT | Performed by: NURSE PRACTITIONER

## 2023-10-18 PROCEDURE — 3008F BODY MASS INDEX DOCD: CPT | Performed by: NURSE PRACTITIONER

## 2023-10-18 RX ORDER — NALTREXONE HYDROCHLORIDE 50 MG/1
25 TABLET, FILM COATED ORAL
Qty: 15 TABLET | Refills: 2 | Status: SHIPPED | OUTPATIENT
Start: 2023-10-18

## 2023-11-20 ENCOUNTER — PATIENT MESSAGE (OUTPATIENT)
Dept: FAMILY MEDICINE CLINIC | Facility: CLINIC | Age: 28
End: 2023-11-20

## 2023-11-20 RX ORDER — TOPIRAMATE 50 MG/1
25 TABLET, FILM COATED ORAL 2 TIMES DAILY
Qty: 60 TABLET | Refills: 2 | Status: SHIPPED | OUTPATIENT
Start: 2023-11-20

## 2023-11-20 NOTE — TELEPHONE ENCOUNTER
Requested Prescriptions     Pending Prescriptions Disp Refills    TOPIRAMATE 50 MG Oral Tab [Pharmacy Med Name: TOPIRAMATE 50 MG TABLET] 60 tablet 1     Sig: TAKE 0.5 TABLETS (25 MG TOTAL) BY MOUTH DAILY FOR 7 DAYS, THEN 1 TABLET (50 MG TOTAL) DAILY FOR 7 DAYS, THEN 1 TABLET (50 MG TOTAL) 2 (TWO) TIMES A DAY. Last fill was 9/25/23 #60 1 refill    Last OV 9/25/23    Sig changed to reflect BID dosing.     FOV 12/27/23

## 2023-11-21 NOTE — TELEPHONE ENCOUNTER
From: Favio Abad  To: Austin Harmon  Sent: 11/20/2023 7:36 AM CST  Subject: Sore Throat     Good Morning Dr. Rupal Peres,    I was wondering if you have any remedies on how to treat a sore throat. This has been going on for over a week and all the in home remedies Alessandra Carolina been doing are not working and still have the sore throat. If you can respond as soon as you can that would be greatly appreciated. Have a great day!      Lachelle Garsia

## 2023-11-24 ENCOUNTER — V-VISIT (OUTPATIENT)
Dept: URGENT CARE | Age: 28
End: 2023-11-24

## 2023-11-24 VITALS
WEIGHT: 205 LBS | HEIGHT: 63 IN | DIASTOLIC BLOOD PRESSURE: 78 MMHG | RESPIRATION RATE: 18 BRPM | HEART RATE: 86 BPM | TEMPERATURE: 96.6 F | OXYGEN SATURATION: 100 % | BODY MASS INDEX: 36.32 KG/M2 | SYSTOLIC BLOOD PRESSURE: 108 MMHG

## 2023-11-24 DIAGNOSIS — L25.9 CONTACT DERMATITIS, UNSPECIFIED CONTACT DERMATITIS TYPE, UNSPECIFIED TRIGGER: Primary | ICD-10-CM

## 2023-11-24 PROBLEM — F41.9 ANXIETY: Status: ACTIVE | Noted: 2022-03-22

## 2023-11-24 PROBLEM — E88.819 INSULIN RESISTANCE: Status: ACTIVE | Noted: 2023-10-18

## 2023-11-24 PROBLEM — E66.9 OBESITY (BMI 30-39.9): Status: ACTIVE | Noted: 2021-09-15

## 2023-11-24 PROBLEM — Z51.81 ENCOUNTER FOR THERAPEUTIC DRUG MONITORING: Status: ACTIVE | Noted: 2021-09-15

## 2023-11-24 PROBLEM — K76.0 FATTY LIVER: Status: ACTIVE | Noted: 2023-10-18

## 2023-11-24 PROBLEM — E66.01 SEVERE OBESITY (CMD): Status: ACTIVE | Noted: 2021-09-15

## 2023-11-24 PROBLEM — F33.1 MODERATE EPISODE OF RECURRENT MAJOR DEPRESSIVE DISORDER (CMD): Status: ACTIVE | Noted: 2022-03-22

## 2023-11-24 PROCEDURE — 99213 OFFICE O/P EST LOW 20 MIN: CPT | Performed by: NURSE PRACTITIONER

## 2023-11-24 RX ORDER — NALTREXONE HYDROCHLORIDE 50 MG/1
TABLET, FILM COATED ORAL
COMMUNITY
Start: 2023-11-19

## 2023-11-24 RX ORDER — METFORMIN HYDROCHLORIDE 750 MG/1
TABLET, EXTENDED RELEASE ORAL
COMMUNITY
Start: 2023-09-29

## 2023-11-24 RX ORDER — METFORMIN HYDROCHLORIDE 750 MG/1
1500 TABLET, EXTENDED RELEASE ORAL
COMMUNITY
Start: 2023-08-31

## 2023-11-24 RX ORDER — TOPIRAMATE 50 MG/1
25 TABLET, FILM COATED ORAL
COMMUNITY
Start: 2023-11-20

## 2023-11-24 RX ORDER — ESCITALOPRAM OXALATE 20 MG/1
1 TABLET ORAL DAILY
COMMUNITY
Start: 2023-08-31

## 2023-11-24 RX ORDER — LORATADINE 10 MG/1
10 TABLET ORAL DAILY
COMMUNITY

## 2023-11-24 RX ORDER — PHENTERMINE HYDROCHLORIDE 37.5 MG/1
37.5 TABLET ORAL
COMMUNITY
Start: 2023-09-25

## 2023-11-24 RX ORDER — PHENTERMINE HYDROCHLORIDE 37.5 MG/1
CAPSULE ORAL
COMMUNITY

## 2023-11-24 RX ORDER — NALTREXONE HYDROCHLORIDE 50 MG/1
25 TABLET, FILM COATED ORAL
COMMUNITY
Start: 2023-10-18

## 2023-11-24 RX ORDER — PREDNISONE 20 MG/1
20 TABLET ORAL DAILY
Qty: 5 TABLET | Refills: 0 | Status: SHIPPED | OUTPATIENT
Start: 2023-11-24 | End: 2023-11-29

## 2023-11-24 RX ORDER — PHENTERMINE HYDROCHLORIDE 37.5 MG/1
37.5 TABLET ORAL EVERY MORNING
COMMUNITY
Start: 2023-10-27

## 2023-11-24 RX ORDER — TOPIRAMATE 50 MG/1
25 TABLET, FILM COATED ORAL 2 TIMES DAILY
COMMUNITY
Start: 2023-11-20

## 2023-11-24 ASSESSMENT — ENCOUNTER SYMPTOMS: ROS SKIN COMMENTS: ITCHY

## 2023-11-29 ENCOUNTER — TELEPHONE (OUTPATIENT)
Dept: FAMILY MEDICINE CLINIC | Facility: CLINIC | Age: 28
End: 2023-11-29

## 2023-11-29 NOTE — TELEPHONE ENCOUNTER
Spoke to patient. States she has had cough/cold symptoms but those are now improving. Then she said she had a itchy rash to her neck but that is also gone now. Monday she began having stomach upset. She had one emesis on Monday. No further. She had 3 diarrhea stools on Monday, 3 yesterday, and 2 so far today. Watery stools. I advised drinking plenty of fluids and avoiding coffee and soda. Advised BRAT diet. Advised to give it a couple more days and call if this persists. She VU and is in agreement.

## 2023-11-29 NOTE — TELEPHONE ENCOUNTER
Patient calling, she has been sick for about 3 weeks sore throat, cough, rash on back (was given an steroid rx) now she has upset stomach/vomiting, she is not sure if it is a virus, she has an upcoming appt. On 12/27/23 with Dr. Mindy Parrish, but doesn't want to wait that long maybe run some tests? Please advise.

## 2023-12-24 DIAGNOSIS — Z51.81 ENCOUNTER FOR THERAPEUTIC DRUG MONITORING: ICD-10-CM

## 2023-12-24 DIAGNOSIS — K76.0 FATTY LIVER: ICD-10-CM

## 2023-12-24 DIAGNOSIS — E66.9 OBESITY (BMI 30-39.9): ICD-10-CM

## 2023-12-24 DIAGNOSIS — E66.01 SEVERE OBESITY (HCC): ICD-10-CM

## 2023-12-26 RX ORDER — METFORMIN HYDROCHLORIDE 750 MG/1
1500 TABLET, EXTENDED RELEASE ORAL
Qty: 60 TABLET | Refills: 0 | Status: SHIPPED | OUTPATIENT
Start: 2023-12-26

## 2024-01-04 DIAGNOSIS — E66.01 SEVERE OBESITY (HCC): ICD-10-CM

## 2024-01-04 DIAGNOSIS — Z51.81 ENCOUNTER FOR THERAPEUTIC DRUG MONITORING: ICD-10-CM

## 2024-01-04 DIAGNOSIS — E66.9 OBESITY (BMI 30-39.9): ICD-10-CM

## 2024-01-07 DIAGNOSIS — F41.9 ANXIETY: ICD-10-CM

## 2024-01-09 DIAGNOSIS — Z51.81 ENCOUNTER FOR THERAPEUTIC DRUG MONITORING: ICD-10-CM

## 2024-01-09 DIAGNOSIS — E66.01 SEVERE OBESITY (HCC): ICD-10-CM

## 2024-01-09 DIAGNOSIS — E66.9 OBESITY (BMI 30-39.9): ICD-10-CM

## 2024-01-09 RX ORDER — PHENTERMINE HYDROCHLORIDE 37.5 MG/1
37.5 TABLET ORAL EVERY MORNING
Qty: 30 TABLET | Refills: 2 | OUTPATIENT
Start: 2024-01-09

## 2024-01-09 RX ORDER — ESCITALOPRAM OXALATE 20 MG/1
20 TABLET ORAL DAILY
Qty: 90 TABLET | Refills: 0 | Status: SHIPPED | OUTPATIENT
Start: 2024-01-09

## 2024-01-18 ENCOUNTER — OFFICE VISIT (OUTPATIENT)
Dept: INTERNAL MEDICINE CLINIC | Facility: CLINIC | Age: 29
End: 2024-01-18
Payer: COMMERCIAL

## 2024-01-18 DIAGNOSIS — R63.8 CRAVING FOR PARTICULAR FOOD: ICD-10-CM

## 2024-01-18 DIAGNOSIS — E88.819 INSULIN RESISTANCE: ICD-10-CM

## 2024-01-18 DIAGNOSIS — Z51.81 ENCOUNTER FOR THERAPEUTIC DRUG MONITORING: Primary | ICD-10-CM

## 2024-01-18 DIAGNOSIS — E66.01 CLASS 2 SEVERE OBESITY WITH SERIOUS COMORBIDITY AND BODY MASS INDEX (BMI) OF 37.0 TO 37.9 IN ADULT, UNSPECIFIED OBESITY TYPE  (HCC): ICD-10-CM

## 2024-01-18 DIAGNOSIS — K76.0 FATTY LIVER: ICD-10-CM

## 2024-01-18 PROCEDURE — 3079F DIAST BP 80-89 MM HG: CPT | Performed by: NURSE PRACTITIONER

## 2024-01-18 PROCEDURE — 3008F BODY MASS INDEX DOCD: CPT | Performed by: NURSE PRACTITIONER

## 2024-01-18 PROCEDURE — 99214 OFFICE O/P EST MOD 30 MIN: CPT | Performed by: NURSE PRACTITIONER

## 2024-01-18 PROCEDURE — 3074F SYST BP LT 130 MM HG: CPT | Performed by: NURSE PRACTITIONER

## 2024-01-18 RX ORDER — NALTREXONE HYDROCHLORIDE 50 MG/1
25 TABLET, FILM COATED ORAL
Qty: 15 TABLET | Refills: 2 | Status: SHIPPED | OUTPATIENT
Start: 2024-01-18

## 2024-01-18 RX ORDER — TIRZEPATIDE 5 MG/.5ML
5 INJECTION, SOLUTION SUBCUTANEOUS WEEKLY
Qty: 2 ML | Refills: 1 | Status: SHIPPED | OUTPATIENT
Start: 2024-01-18

## 2024-01-18 RX ORDER — TIRZEPATIDE 2.5 MG/.5ML
2.5 INJECTION, SOLUTION SUBCUTANEOUS WEEKLY
Qty: 2 ML | Refills: 0 | Status: SHIPPED | OUTPATIENT
Start: 2024-01-18

## 2024-01-18 RX ORDER — LORATADINE 10 MG/1
10 TABLET ORAL DAILY
COMMUNITY

## 2024-01-18 NOTE — PATIENT INSTRUCTIONS
Continue making lifestyle changes that focus on good nutrition, regular exercise and stress management.    Medication Plan: Continue current medication regimen, remaining off phentermine at this time. Start Zepbound at 2.5 mg weekly. After 4 weeks start the next dose of 5 mg weekly with additional refill. Visit the website www.zepbound.[employer] for coupon and further education on dosing. This medication may require a prior authorization (PA) by your insurance. A PA may take one week plus to complete and our office will be in touch during this process if needed. If cost prohibitive plan: plan to resume phentermine.    Tips while taking an injectable weight loss medication:    Be an intuitive eater. Listen to your hunger and fullness signals, stopping when you are full.  Consume protein and produce in your day, striving for a rainbow of color of produce.  Reduce portions to staring size of 1 cup size and check in with your gut to see if you are full. Use a sand timer to slow down your eating pace to allow for 15-20 minutes to complete a meal.  Reduce refined sugars and high fat foods, as they may contribute to greater side effects of nausea and heartburn.  Stop eating 3 hours before bedtime to allow your food to digest.  Remain hydrated with water or non caloric and non caffeine beverages.  Use over the counter kaitlyn lozenge/supplement to help reduce nausea if needed.    Next steps to work on before next office visit include:     Monitor for any symptoms of fever, cough, shortness of breath and follow up with your PCP or urgent care due to intermittent wheezing heard over your right lung field.  Consider follow up with dietician to establish nutrition plan and support healthy eating practices.    Set your food environment up for success with tips listed below to help support your health journey and provide less temptations!  How to Create a Healthy Food Environment in Your Home  December 28, 2020  Posted in Blog,  Nutrition  By [employer]    Why create a healthy food environment in your home? Every day, decisions we make about food are influenced by hundreds of factors, many of them subconscious. For example, an open box of cookies on the counter is more tempting than an apple in your crisper.  Having a healthy food environment at home will make it easier to choose healthy options and stay on track with habits that support your health goals.  Here are a few steps you can take to set up your home for success.  How to Build a Healthy Food Environment  1 - Clear Your Home of Less-healthy Food  These foods are okay in moderation, but they can be enjoyed outside of the house in a fun and empowering way. Go through your kitchen fridge, freezer, pantry, cabinets and counters to toss out foods that aren't helping you meet your health goals.  2 - Put Healthy Foods in Plain Sight  Keep grab-n-go fruit on the counter instead of snack foods so they are easily accessible. Consider moving your refrigerated fruit to a shelf at eye-level instead of keeping it in the crisper. Vice versa, move less healthy foods to the crisper so they aren't always in your line of sight.  3 - Find Healthy Substitutions  Feeling deprived of your favorite foods can make you miserable and resentful. Instead, experiment with healthier alternatives. Here are some examples:  Calorie-controlled frozen yogurt bars like Yasso vs high-calorie ice cream novelties  Sparkling water like Bubbly vs soda or fruit juice  Skinny popcorn vs chips and crackers  Dessert hummus vs pudding cups or ice cream  4 - Clean and Organize  A messy kitchen can make it hard to find healthy choices when you are hungry or short on time. It also makes it hard to locate whether or not you need something from the grocery store. Give your kitchen a new year makeover and organize your food storage areas so you can always locate what you need without distraction.  5 - Eat at Your  Kitchen Table  Eating at your kitchen table without distractions is a way to eat more mindfully and enjoy time with your family. Eating on the couch while watching shows or movies can encourage mindless eating and consuming more calories than intended.  6 - Keep Healthy Foods Readily Available  When you are hungry and in a pinch, it's important to have healthy foods on-hand so you don't make hunger-controlled choices. This means keeping ready-to-go-snack foods in your kitchen such as fruit paired with nut butter, hummus and carrots, lunch meat and cheese roll-ups, etc. It may also help to keep pre-made freezer meals on-hand (something you've cooked in advance) for busy weeknights when you don't have time to prepare food.  Turning your home into a healthy food environment can set you up for success and confident decision-making. Plus, it feels good to be organized and empowered!    Balanced Nutrition includes:     Build the mentality of Food 4 Fuel. Clean eating with whole foods and eliminating/reducing ultra processed foods.  Be an intuitive eater and using mindful eating practices.  Eat a balanced plate with protein and produce at all meals: 1/4 plate- protein, 1/2 plate non starchy veggie, and 1/4 plate fruit or complex carbohydrate.  Drink water with all meals.  Eliminate/reduce late night eating by stopping after 7pm. Allowing your body to fast for 12 hours (drink only water, tea or black coffee without any additives).            Obesity is one of the most common health issues these days that is found among all age groups. It leads to various other diseases and disorders. We often wonder what obesity is and what makes us obese despite our rich lifestyles. Obesity is nothing but an excess of body weight caused due to overeating, overconsumption of calories, or a lack of physical activity. Stress, genetics, toxins and medicinal side effects can also lead to obesity. However, one of the major reasons behind it is  Toxic Hunger.  What is Hunger?  Hunger is a feeling we all experience on a daily basis. It is very important to experience the feeling of hunger too. When the glycogen levels run low, our liver sends these signals to our brain so that we eat and replenish the glycogen levels.  However, hunger is of two types - toxic hunger and true hunger.  What is Toxic Hunger?  Our body experiences toxic hunger because we do not get a proper amount of micronutrients in our diet. Further, the food we eat these days consists of processed products and junk food that is excessively high in sugar and salt content. These are nothing but toxins which get accumulated in the intracellular regions of our body. As a result, these toxins start to metabolize in our body and their level in the blood increases, which ultimately reflects the symptoms of toxic hunger such as discomfort, fatigue, headache, weakness, and stress. These symptoms tell our body that it needs more food which makes you feel hungry. Because of this, you tend to mindlessly consume more unhealthy food. The scary part is that this process keeps repeating itself.  The reason for this toxic hunger is that these fat cells are always starving which gives incorrect signals to our brain and we continue eating more and more to feed more calories to our fat cells. This overconsumption of calories results in the problem of obesity.  Toxic hunger gives rise to addiction and withdrawal symptoms too. When we continuously eat an excessive amount of junk and processed food, our body gets addicted to it. And if we discontinue eating these foods, we tend to become sick. This is known as a withdrawal symptom. A common example of this is the intake of coffee. There are people who are habitual of drinking 3-5 cups of coffee every day. When they don’t get their regular dose of coffee, they start experiencing headaches. These withdrawal symptoms occur because our body is now trying to repair the  damage caused by the noxious food.  How do we get rid of Obesity?  It is perfectly okay to feel hungry but it is important to feel the right kind of hunger. True hunger is felt in the stomach and not in the head. Addiction, overeating, withdrawal symptoms, and food cravings can be avoided by consumption of the right type of food.  Today, we live in a world where we have easy access to a variety of processed and junk food. However, one should try to avoid these as much as possible so as to get rid of obesity. Follow these tips to reduce obesity and have a healthier body.  Add a lot of colors to your diet - Eat as many fruits and vegetables as you can. Raw fruits and vegetables have a healthy amount of fiber, and they are also low in sugar, thus keeping us feeling full for a longer period of time with the sustained release of sugar from our food. High intake of fiber helps to reduce the fat in the body..  Whole foods such as whole grains, seeds, nuts, and green leafy vegetables are high in protein and low in calories and fat. Increasing their consumption will eventually help you to cope with the toxic hunger. This will ultimately help to reduce the food cravings as well as treat obesity by reducing the body fat.  Well, friends, it’s high time you should understand the causes of your health issues and take charge of your body. Not only will it help you to treat the disease but it will also guide you to take precautionary measures to prevent it. You cannot entirely avoid eating out and consuming junk food as we are surrounded by it. However, what you can do is to control how often you consume it and the portion size. You may have it once in a while. But make sure that the amount you consume should be just for pleasure and not for filling up your stomach to the full.  Blog post from www.[employer]

## 2024-01-18 NOTE — PROGRESS NOTES
Jil Altamirano is a 28 year old female presents today for follow up on medical weight loss program for the treatment of overweight, obesity, or morbid obesity with associated fatty liver.    S:  Current weight   Wt Readings from Last 6 Encounters:   01/18/24 205 lb (93 kg)   10/18/23 214 lb (97.1 kg)   09/25/23 215 lb (97.5 kg)   02/13/23 204 lb (92.5 kg)   11/08/22 204 lb (92.5 kg)   10/03/22 204 lb (92.5 kg)    AND BMI Body mass index is 36.31 kg/m²..    Patient has lost 9# since LOV 3 months ago. She has been consistent with medication but out of phentermine for the past 1-2 weeks d/t lapse in f/u with PCP.    Testing/consult completed since LOV: Dietician: Estimated caloric needs for weight loss: 2210-177=4530 cals/d for 1 pounds/week weight loss.    [facility] Medical Weight Loss Follow Up    Question 1/18/2024  1:54 PM CST - Filed by Patient   Please describe a success moment: Losing some weight and been eating less.   Please describe a challenging moment/needs for improvement: Watching what I eat and overeating of what I choose to eat.   Please complete this 24 hour food journal, listing everything you had to eat in the past day. Include the average time of day you ate these meals at    List foods, qty and prep for breakfast: 2 jenny chocolate bars   List foods, qty and prep for lunch. nothing   List foods, qty and prep for dinner. nothing   List foods, qty and prep for snacks. 2 cinnamon rolls   List the types and qty of fluids consumed 2 16oz bottles of water, 12 oz of chocolate milk, 20 oz of cherry pepsi   On average, how many meals did you eat out per week? 5   Exercise    How many days per week are you active or exercise 2   On average, how many days were anaerobic (strength/resistance) exercises performed? 0   On average, how many days were aerobic (cardio) exercises performed? 0   Perceived level of exertion on a scale of 1-5, with 5 being very intense: 1   Stress    Average stress level on a scale of  1-10, with 10 being extremely stressed: 4   If greater than 5/1O how would you grade your coping mechanisms? moderate   Sleep hours and integrity    How many hours of uninterrupted sleep do you get a night: 1   Do you feel rested in the morning: No   If no, what may have been disrupting your sleep? Tossing and turning, sometimes get waken up by my dogs   Please list any goal(s) for your next visit Try to continue my current hoal of eating less meals but less fatting foods     Social hx and PMH reviewed. Employed as  in [employer] and at [employer]. Single with boyfriend, lives with her Mom    REVIEW OF SYSTEMS:  GENERAL: feels well otherwise  LUNGS: denies shortness of breath with exertion  CARDIOVASCULAR: denies chest pain on exertion, denies palpitations or pedal edema  GI: denies abdominal pain.  No N/V/D/C  MUSCULOSKELETAL: no acute joint or muscle pain  NEURO: denies headaches  PSYCH: denies change in behavior or mood, denies feeling sad or depressed. Answers submitted by the patient for this visit:  Medical Weight Loss Follow Up (Submitted on 1/18/2024)  If greater than 5/1O how would you grade your coping mechanisms?: moderate      EXAM:  /82   Pulse 70   Resp 16   Ht 5' 3\" (1.6 m)   Wt 205 lb (93 kg)   LMP 01/09/2024 (Exact Date)   BMI 36.31 kg/m²   GENERAL: well developed, well nourished, in no apparent distress, obese  EYES: conjunctiva pink, sclera non icteric, PERRLA  LUNGS: right upper posterior field with expiratory wheeze, cleared with deep breath, breathing non labored, no cough  CARDIO: RRR without murmur, normal S1 and S2 without clicks or gallops, no pedal edema.  GI: +BS  NEURO/MS: motor and sensory grossly intact  PSYCH: pleasant, cooperative, normal mood and affect    ASSESSMENT AND PLAN:  Reviewed Initial Weight Data and Goal Weight Loss:       Encounter Diagnoses   Name Primary?    Encounter for therapeutic drug monitoring Yes    Class 2 severe obesity with serious  comorbidity and body mass index (BMI) of 37.0 to 37.9 in adult, unspecified obesity type  (HCC)     Insulin resistance     Fatty liver     Craving for particular food        No orders of the defined types were placed in this encounter.      Meds & Refills for this Visit:  Requested Prescriptions     Signed Prescriptions Disp Refills    naltrexone 50 MG Oral Tab 15 tablet 2     Sig: Take 0.5 tablets (25 mg total) by mouth daily with food. For cravings    Tirzepatide-Weight Management (ZEPBOUND) 2.5 MG/0.5ML Subcutaneous Solution Auto-injector 2 mL 0     Sig: Inject 2.5 mg into the skin once a week.    Tirzepatide-Weight Management (ZEPBOUND) 5 MG/0.5ML Subcutaneous Solution Auto-injector 2 mL 1     Sig: Inject 5 mg into the skin once a week. Start after completing full 4 weeks on 2.5 mg weekly dose.       Imaging & Consults:  None      Plan:  Patient has lost 9# since LOV 3 months ago on Topamax 50 mg BID (PCP), off phentermine 37.5 mg daily (PCP), Metformin ER 1500 mg daily (PCP), naltrexone 25 mg daily with a total weight loss of 19# since initial consult on 9/15/21 with initial weight of 224#. Weight loss goal: weight loss to improve health.  CPM, aside from add Zepbound as directed.  on setting up a healthy food environment, balanced nutrition and toxic hunger. See patient instructions below for additional plans and patient counseling.      Patient Instructions   Continue making lifestyle changes that focus on good nutrition, regular exercise and stress management.    Medication Plan: Continue current medication regimen, remaining off phentermine at this time. Start Zepbound at 2.5 mg weekly. After 4 weeks start the next dose of 5 mg weekly with additional refill. Visit the website www.zepbound.[employer] for coupon and further education on dosing. This medication may require a prior authorization (PA) by your insurance. A PA may take one week plus to complete and our office will be in touch during this  process if needed. If cost prohibitive plan: plan to resume phentermine.    Tips while taking an injectable weight loss medication:    Be an intuitive eater. Listen to your hunger and fullness signals, stopping when you are full.  Consume protein and produce in your day, striving for a rainbow of color of produce.  Reduce portions to staring size of 1 cup size and check in with your gut to see if you are full. Use a sand timer to slow down your eating pace to allow for 15-20 minutes to complete a meal.  Reduce refined sugars and high fat foods, as they may contribute to greater side effects of nausea and heartburn.  Stop eating 3 hours before bedtime to allow your food to digest.  Remain hydrated with water or non caloric and non caffeine beverages.  Use over the counter kaitlyn lozenge/supplement to help reduce nausea if needed.    Next steps to work on before next office visit include:     Monitor for any symptoms of fever, cough, shortness of breath and follow up with your PCP or urgent care due to intermittent wheezing heard over your right lung field.  Consider follow up with dietician to establish nutrition plan and support healthy eating practices.    Set your food environment up for success with tips listed below to help support your health journey and provide less temptations!  How to Create a Healthy Food Environment in Your Home  December 28, 2020  Posted in Blog, Nutrition  By [employer]    Why create a healthy food environment in your home? Every day, decisions we make about food are influenced by hundreds of factors, many of them subconscious. For example, an open box of cookies on the counter is more tempting than an apple in your crisper.  Having a healthy food environment at home will make it easier to choose healthy options and stay on track with habits that support your health goals.  Here are a few steps you can take to set up your home for success.  How to Build a Healthy Food  Environment  1 - Clear Your Home of Less-healthy Food  These foods are okay in moderation, but they can be enjoyed outside of the house in a fun and empowering way. Go through your kitchen fridge, freezer, pantry, cabinets and counters to toss out foods that aren't helping you meet your health goals.  2 - Put Healthy Foods in Plain Sight  Keep grab-n-go fruit on the counter instead of snack foods so they are easily accessible. Consider moving your refrigerated fruit to a shelf at eye-level instead of keeping it in the crisper. Vice versa, move less healthy foods to the crisper so they aren't always in your line of sight.  3 - Find Healthy Substitutions  Feeling deprived of your favorite foods can make you miserable and resentful. Instead, experiment with healthier alternatives. Here are some examples:  Calorie-controlled frozen yogurt bars like Yasso vs high-calorie ice cream novelties  Sparkling water like Bubbly vs soda or fruit juice  Skinny popcorn vs chips and crackers  Dessert hummus vs pudding cups or ice cream  4 - Clean and Organize  A messy kitchen can make it hard to find healthy choices when you are hungry or short on time. It also makes it hard to locate whether or not you need something from the grocery store. Give your kitchen a new year makeover and organize your food storage areas so you can always locate what you need without distraction.  5 - Eat at Your Kitchen Table  Eating at your kitchen table without distractions is a way to eat more mindfully and enjoy time with your family. Eating on the couch while watching shows or movies can encourage mindless eating and consuming more calories than intended.  6 - Keep Healthy Foods Readily Available  When you are hungry and in a pinch, it's important to have healthy foods on-hand so you don't make hunger-controlled choices. This means keeping ready-to-go-snack foods in your kitchen such as fruit paired with nut butter, hummus and carrots, lunch meat and  cheese roll-ups, etc. It may also help to keep pre-made freezer meals on-hand (something you've cooked in advance) for busy weeknights when you don't have time to prepare food.  Turning your home into a healthy food environment can set you up for success and confident decision-making. Plus, it feels good to be organized and empowered!    Balanced Nutrition includes:     Build the mentality of Food 4 Fuel. Clean eating with whole foods and eliminating/reducing ultra processed foods.  Be an intuitive eater and using mindful eating practices.  Eat a balanced plate with protein and produce at all meals: 1/4 plate- protein, 1/2 plate non starchy veggie, and 1/4 plate fruit or complex carbohydrate.  Drink water with all meals.  Eliminate/reduce late night eating by stopping after 7pm. Allowing your body to fast for 12 hours (drink only water, tea or black coffee without any additives).            Obesity is one of the most common health issues these days that is found among all age groups. It leads to various other diseases and disorders. We often wonder what obesity is and what makes us obese despite our rich lifestyles. Obesity is nothing but an excess of body weight caused due to overeating, overconsumption of calories, or a lack of physical activity. Stress, genetics, toxins and medicinal side effects can also lead to obesity. However, one of the major reasons behind it is Toxic Hunger.  What is Hunger?  Hunger is a feeling we all experience on a daily basis. It is very important to experience the feeling of hunger too. When the glycogen levels run low, our liver sends these signals to our brain so that we eat and replenish the glycogen levels.  However, hunger is of two types - toxic hunger and true hunger.  What is Toxic Hunger?  Our body experiences toxic hunger because we do not get a proper amount of micronutrients in our diet. Further, the food we eat these days consists of processed products and junk food that  is excessively high in sugar and salt content. These are nothing but toxins which get accumulated in the intracellular regions of our body. As a result, these toxins start to metabolize in our body and their level in the blood increases, which ultimately reflects the symptoms of toxic hunger such as discomfort, fatigue, headache, weakness, and stress. These symptoms tell our body that it needs more food which makes you feel hungry. Because of this, you tend to mindlessly consume more unhealthy food. The scary part is that this process keeps repeating itself.  The reason for this toxic hunger is that these fat cells are always starving which gives incorrect signals to our brain and we continue eating more and more to feed more calories to our fat cells. This overconsumption of calories results in the problem of obesity.  Toxic hunger gives rise to addiction and withdrawal symptoms too. When we continuously eat an excessive amount of junk and processed food, our body gets addicted to it. And if we discontinue eating these foods, we tend to become sick. This is known as a withdrawal symptom. A common example of this is the intake of coffee. There are people who are habitual of drinking 3-5 cups of coffee every day. When they don’t get their regular dose of coffee, they start experiencing headaches. These withdrawal symptoms occur because our body is now trying to repair the damage caused by the noxious food.  How do we get rid of Obesity?  It is perfectly okay to feel hungry but it is important to feel the right kind of hunger. True hunger is felt in the stomach and not in the head. Addiction, overeating, withdrawal symptoms, and food cravings can be avoided by consumption of the right type of food.  Today, we live in a world where we have easy access to a variety of processed and junk food. However, one should try to avoid these as much as possible so as to get rid of obesity. Follow these tips to reduce obesity and  have a healthier body.  Add a lot of colors to your diet - Eat as many fruits and vegetables as you can. Raw fruits and vegetables have a healthy amount of fiber, and they are also low in sugar, thus keeping us feeling full for a longer period of time with the sustained release of sugar from our food. High intake of fiber helps to reduce the fat in the body..  Whole foods such as whole grains, seeds, nuts, and green leafy vegetables are high in protein and low in calories and fat. Increasing their consumption will eventually help you to cope with the toxic hunger. This will ultimately help to reduce the food cravings as well as treat obesity by reducing the body fat.  Well, friends, it’s high time you should understand the causes of your health issues and take charge of your body. Not only will it help you to treat the disease but it will also guide you to take precautionary measures to prevent it. You cannot entirely avoid eating out and consuming junk food as we are surrounded by it. However, what you can do is to control how often you consume it and the portion size. You may have it once in a while. But make sure that the amount you consume should be just for pleasure and not for filling up your stomach to the full.  Blog post from www.[employer]          Medication use and SEs reviewed with patient.    Return in about 3 months (around 4/18/2024) for weight management via clinic or VV.    Patient verbalizes understanding.    DOCUMENTATION OF TIME SPENT: Code selection for this visit was based on time spent : 30 minutes on date of service in preparing to see the patient, obtaining and/or reviewing separately obtained history, performing a medically appropriate examination, counseling and educating the patient/family/caregiver, ordering medications or testing, referring and communicating with other healthcare providers, documenting clinical information in the electronic medical record, independently  interpreting results and communicating results to the patient/family/caregiver and care coordination with the patient's other providers.

## 2024-01-21 VITALS
HEIGHT: 63 IN | BODY MASS INDEX: 36.32 KG/M2 | RESPIRATION RATE: 16 BRPM | SYSTOLIC BLOOD PRESSURE: 116 MMHG | HEART RATE: 70 BPM | DIASTOLIC BLOOD PRESSURE: 82 MMHG | WEIGHT: 205 LBS

## 2024-01-21 DIAGNOSIS — K76.0 FATTY LIVER: ICD-10-CM

## 2024-01-21 DIAGNOSIS — Z51.81 ENCOUNTER FOR THERAPEUTIC DRUG MONITORING: ICD-10-CM

## 2024-01-21 DIAGNOSIS — E66.9 OBESITY (BMI 30-39.9): ICD-10-CM

## 2024-01-21 DIAGNOSIS — E66.01 SEVERE OBESITY (HCC): ICD-10-CM

## 2024-01-22 RX ORDER — METFORMIN HYDROCHLORIDE 750 MG/1
1500 TABLET, EXTENDED RELEASE ORAL
Qty: 60 TABLET | Refills: 0 | Status: SHIPPED | OUTPATIENT
Start: 2024-01-22

## 2024-01-22 NOTE — TELEPHONE ENCOUNTER
Requested Prescriptions     Signed Prescriptions Disp Refills    METFORMIN  MG Oral Tablet 24 Hr 60 tablet 0     Sig: TAKE 2 TABLETS (1,500 MG TOTAL) BY MOUTH DAILY WITH FOOD     Authorizing Provider: CHUN SCHULTZ     Ordering User: YUE HAWKINS     Refilled per protocol/OV notes

## 2024-01-22 NOTE — TELEPHONE ENCOUNTER
Pt requesting refill of Sprintec 28 Oral Tablet 0.25-35 MG-MCG  Last Time Medication was Prescribed :  09/29/2021 qty # 80 with 0 refills    Last Office Visit with Provider:    In office 07/28/2021   telemedicine 12/22/2021 covid    Recommended to return by 22-Jan-2024 03:16

## 2024-01-25 DIAGNOSIS — E66.01 CLASS 2 SEVERE OBESITY WITH SERIOUS COMORBIDITY AND BODY MASS INDEX (BMI) OF 37.0 TO 37.9 IN ADULT, UNSPECIFIED OBESITY TYPE  (HCC): ICD-10-CM

## 2024-01-25 DIAGNOSIS — R63.8 CRAVING FOR PARTICULAR FOOD: ICD-10-CM

## 2024-01-25 DIAGNOSIS — Z51.81 ENCOUNTER FOR THERAPEUTIC DRUG MONITORING: ICD-10-CM

## 2024-01-25 RX ORDER — NALTREXONE HYDROCHLORIDE 50 MG/1
25 TABLET, FILM COATED ORAL
Qty: 15 TABLET | Refills: 2 | OUTPATIENT
Start: 2024-01-25

## 2024-01-25 NOTE — TELEPHONE ENCOUNTER
Requesting   Requested Prescriptions     Pending Prescriptions Disp Refills    naltrexone 50 MG Oral Tab 15 tablet 2     Sig: Take 0.5 tablets (25 mg total) by mouth daily with food. For cravings       LOV: 1/18/2024  RTC: 3 months   Last Relevant Labs:   Filled: 1/18/2024 #15 with 2 refills    Future Appointments   Date Time Provider Department Center   2/22/2024  1:30 PM Danny Wiley MD [facility] [place]   4/23/2024  2:00 PM Nori Curry APRN [facility]     Already sent

## 2024-01-26 ENCOUNTER — TELEPHONE (OUTPATIENT)
Dept: INTERNAL MEDICINE CLINIC | Facility: CLINIC | Age: 29
End: 2024-01-26

## 2024-01-26 NOTE — TELEPHONE ENCOUNTER
Patient left a message she would like to know why her med is denied since she was just seen one week ago.  I tried calling back and got voicemail.    The naltrexone was denied because an order was sent 1/18/24 for 3 months to the [facility] and confirmed.  I left her a message to call and speak with the pharmacist and sent the verification to her on my chart.

## 2024-01-30 RX ORDER — TOPIRAMATE 50 MG/1
25 TABLET, FILM COATED ORAL 2 TIMES DAILY
Qty: 60 TABLET | Refills: 2 | OUTPATIENT
Start: 2024-01-30

## 2024-01-30 NOTE — TELEPHONE ENCOUNTER
Requested Prescriptions     Pending Prescriptions Disp Refills    topiramate 50 MG Oral Tab 60 tablet 2     Sig: Take 0.5 tablets (25 mg total) by mouth 2 (two) times daily.     Last fill was 11/23/23 #60 2 refills  Refill too soon

## 2024-02-01 ENCOUNTER — TELEPHONE (OUTPATIENT)
Dept: INTERNAL MEDICINE CLINIC | Facility: CLINIC | Age: 29
End: 2024-02-01

## 2024-02-01 DIAGNOSIS — E66.01 CLASS 2 SEVERE OBESITY WITH SERIOUS COMORBIDITY AND BODY MASS INDEX (BMI) OF 37.0 TO 37.9 IN ADULT, UNSPECIFIED OBESITY TYPE (HCC): ICD-10-CM

## 2024-02-01 DIAGNOSIS — Z51.81 ENCOUNTER FOR THERAPEUTIC DRUG MONITORING: Primary | ICD-10-CM

## 2024-02-01 DIAGNOSIS — R63.8 CRAVING FOR PARTICULAR FOOD: ICD-10-CM

## 2024-02-01 DIAGNOSIS — E88.819 INSULIN RESISTANCE: ICD-10-CM

## 2024-02-21 ENCOUNTER — TELEPHONE (OUTPATIENT)
Dept: FAMILY MEDICINE CLINIC | Facility: CLINIC | Age: 29
End: 2024-02-21

## 2024-02-21 DIAGNOSIS — Z51.81 ENCOUNTER FOR THERAPEUTIC DRUG MONITORING: ICD-10-CM

## 2024-02-21 DIAGNOSIS — K76.0 FATTY LIVER: ICD-10-CM

## 2024-02-21 DIAGNOSIS — E66.01 SEVERE OBESITY (HCC): ICD-10-CM

## 2024-02-21 DIAGNOSIS — E66.9 OBESITY (BMI 30-39.9): ICD-10-CM

## 2024-02-21 RX ORDER — METFORMIN HYDROCHLORIDE 750 MG/1
1500 TABLET, EXTENDED RELEASE ORAL
Qty: 180 TABLET | Refills: 0 | Status: SHIPPED | OUTPATIENT
Start: 2024-02-21

## 2024-02-22 PROBLEM — F41.1 GENERALIZED ANXIETY DISORDER: Status: ACTIVE | Noted: 2017-08-18

## 2024-03-05 DIAGNOSIS — E66.01 CLASS 2 SEVERE OBESITY WITH SERIOUS COMORBIDITY AND BODY MASS INDEX (BMI) OF 37.0 TO 37.9 IN ADULT, UNSPECIFIED OBESITY TYPE (HCC): ICD-10-CM

## 2024-03-05 DIAGNOSIS — Z51.81 ENCOUNTER FOR THERAPEUTIC DRUG MONITORING: ICD-10-CM

## 2024-03-05 RX ORDER — TIRZEPATIDE 2.5 MG/.5ML
2.5 INJECTION, SOLUTION SUBCUTANEOUS WEEKLY
Qty: 2 ML | Refills: 0 | Status: CANCELLED | OUTPATIENT
Start: 2024-03-05

## 2024-03-06 NOTE — TELEPHONE ENCOUNTER
Pa denied in epic for zepbound  Reapplied today in [facility]    MAURO MCKINNEY (Key: [id number])

## 2024-03-12 NOTE — TELEPHONE ENCOUNTER
Zepbound denied by [facility] stating patients must try and fail:  orlistat, qsymia, saxenda and wegovy.   We did attach notes she tried phentermine and FDA shortage notices on wegovy and Saxenda.    Please advise.

## 2024-03-18 RX ORDER — SEMAGLUTIDE 0.5 MG/.5ML
0.5 INJECTION, SOLUTION SUBCUTANEOUS WEEKLY
Qty: 2 ML | Refills: 1 | Status: SHIPPED | OUTPATIENT
Start: 2024-03-18

## 2024-03-22 ENCOUNTER — TELEPHONE (OUTPATIENT)
Dept: INTERNAL MEDICINE CLINIC | Facility: CLINIC | Age: 29
End: 2024-03-22

## 2024-04-30 DIAGNOSIS — R63.8 CRAVING FOR PARTICULAR FOOD: ICD-10-CM

## 2024-04-30 DIAGNOSIS — E66.01 CLASS 2 SEVERE OBESITY WITH SERIOUS COMORBIDITY AND BODY MASS INDEX (BMI) OF 37.0 TO 37.9 IN ADULT, UNSPECIFIED OBESITY TYPE (HCC): ICD-10-CM

## 2024-04-30 DIAGNOSIS — Z51.81 ENCOUNTER FOR THERAPEUTIC DRUG MONITORING: ICD-10-CM

## 2024-04-30 RX ORDER — TOPIRAMATE 50 MG/1
25 TABLET, FILM COATED ORAL 2 TIMES DAILY
Qty: 90 TABLET | Refills: 1 | Status: SHIPPED | OUTPATIENT
Start: 2024-04-30

## 2024-04-30 NOTE — TELEPHONE ENCOUNTER
Requesting   Requested Prescriptions     Pending Prescriptions Disp Refills    NALTREXONE 50 MG Oral Tab [Pharmacy Med Name: NALTREXONE 50 MG TABLET] 15 tablet 2     Sig: TAKE 0.5 TABLETS (25 MG TOTAL) BY MOUTH DAILY WITH FOOD. FOR CRAVINGS     LOV: 1/18/24  RTC: 3 months  Filled: 1/18/24 #15 with 2 refills    Future Appointments   Date Time Provider Department Center   6/25/2024  8:40 AM Nori Curry APRN [facility] [address]   6/25/2024  1:30 PM Danny Wiley MD [facility] [place]

## 2024-04-30 NOTE — TELEPHONE ENCOUNTER
Requested Prescriptions     Signed Prescriptions Disp Refills    TOPIRAMATE 50 MG Oral Tab 90 tablet 1     Sig: TAKE 0.5 TABLETS BY MOUTH 2 TIMES DAILY.     Authorizing Provider: CHUN SCHULTZ     Ordering User: YUE HAWKINS     Refilled per protocol/OV notes

## 2024-05-04 RX ORDER — NALTREXONE HYDROCHLORIDE 50 MG/1
25 TABLET, FILM COATED ORAL
Qty: 15 TABLET | Refills: 1 | Status: SHIPPED | OUTPATIENT
Start: 2024-05-04

## 2024-05-11 DIAGNOSIS — E66.9 OBESITY (BMI 30-39.9): ICD-10-CM

## 2024-05-11 DIAGNOSIS — Z51.81 ENCOUNTER FOR THERAPEUTIC DRUG MONITORING: ICD-10-CM

## 2024-05-11 DIAGNOSIS — K76.0 FATTY LIVER: ICD-10-CM

## 2024-05-11 DIAGNOSIS — E66.01 SEVERE OBESITY (HCC): ICD-10-CM

## 2024-05-13 RX ORDER — METFORMIN HYDROCHLORIDE 750 MG/1
1500 TABLET, EXTENDED RELEASE ORAL
Qty: 180 TABLET | Refills: 0 | Status: SHIPPED | OUTPATIENT
Start: 2024-05-13

## 2024-07-25 DIAGNOSIS — F41.9 ANXIETY: ICD-10-CM

## 2024-07-25 DIAGNOSIS — F33.1 MODERATE EPISODE OF RECURRENT MAJOR DEPRESSIVE DISORDER (HCC): ICD-10-CM

## 2024-07-25 RX ORDER — BUSPIRONE HYDROCHLORIDE 15 MG/1
15 TABLET ORAL DAILY
Qty: 90 TABLET | Refills: 3 | Status: SHIPPED | OUTPATIENT
Start: 2024-07-25

## 2024-08-14 DIAGNOSIS — Z51.81 ENCOUNTER FOR THERAPEUTIC DRUG MONITORING: ICD-10-CM

## 2024-08-14 DIAGNOSIS — E66.01 SEVERE OBESITY (HCC): ICD-10-CM

## 2024-08-14 DIAGNOSIS — E66.9 OBESITY (BMI 30-39.9): ICD-10-CM

## 2024-08-14 DIAGNOSIS — K76.0 FATTY LIVER: ICD-10-CM

## 2024-08-15 RX ORDER — METFORMIN HYDROCHLORIDE 750 MG/1
1500 TABLET, EXTENDED RELEASE ORAL
Qty: 180 TABLET | Refills: 0 | Status: SHIPPED | OUTPATIENT
Start: 2024-08-15

## 2024-09-23 DIAGNOSIS — E66.01 CLASS 2 SEVERE OBESITY WITH SERIOUS COMORBIDITY AND BODY MASS INDEX (BMI) OF 37.0 TO 37.9 IN ADULT, UNSPECIFIED OBESITY TYPE (HCC): ICD-10-CM

## 2024-09-23 DIAGNOSIS — R63.8 CRAVING FOR PARTICULAR FOOD: ICD-10-CM

## 2024-09-23 DIAGNOSIS — Z51.81 ENCOUNTER FOR THERAPEUTIC DRUG MONITORING: ICD-10-CM

## 2024-09-24 RX ORDER — NALTREXONE HYDROCHLORIDE 50 MG/1
25 TABLET, FILM COATED ORAL
Qty: 15 TABLET | Refills: 1 | OUTPATIENT
Start: 2024-09-24

## 2024-09-24 NOTE — TELEPHONE ENCOUNTER
Requesting naltrexone  LOV: 1/18/24  RTC: 3 months  Last Relevant Labs: na  Filled: 5/4/24 #15 with 1 refills    No future appointments.    Refused refill - needs appt.

## 2024-11-10 DIAGNOSIS — E66.9 OBESITY (BMI 30-39.9): ICD-10-CM

## 2024-11-10 DIAGNOSIS — Z51.81 ENCOUNTER FOR THERAPEUTIC DRUG MONITORING: ICD-10-CM

## 2024-11-10 DIAGNOSIS — E66.01 SEVERE OBESITY (HCC): ICD-10-CM

## 2024-11-10 DIAGNOSIS — K76.0 FATTY LIVER: ICD-10-CM

## 2024-11-11 RX ORDER — METFORMIN HYDROCHLORIDE 750 MG/1
1500 TABLET, EXTENDED RELEASE ORAL
Qty: 60 TABLET | Refills: 0 | Status: SHIPPED | OUTPATIENT
Start: 2024-11-11

## 2025-03-04 RX ORDER — TOPIRAMATE 50 MG/1
TABLET, FILM COATED ORAL
Qty: 90 TABLET | Refills: 1 | OUTPATIENT
Start: 2025-03-04

## 2025-04-01 DIAGNOSIS — F33.1 MODERATE EPISODE OF RECURRENT MAJOR DEPRESSIVE DISORDER (HCC): ICD-10-CM

## 2025-04-01 DIAGNOSIS — F41.9 ANXIETY: ICD-10-CM

## 2025-04-02 RX ORDER — BUSPIRONE HYDROCHLORIDE 15 MG/1
TABLET ORAL
Qty: 180 TABLET | Refills: 0 | Status: SHIPPED | OUTPATIENT
Start: 2025-04-02

## 2025-04-02 NOTE — TELEPHONE ENCOUNTER
Requested Prescriptions     Pending Prescriptions Disp Refills    BUSPIRONE 15 MG Oral Tab [Pharmacy Med Name: BUSPIRONE HCL 15 MG TABLET] 180 tablet 1     Sig: TAKE 1 TABLET (15 MG TOTAL) BY MOUTH IN THE MORNING AND BEFORE BEDTIME       Last Refill: 7/25/24    Last OV: 2/22/24

## 2025-04-02 NOTE — TELEPHONE ENCOUNTER
Left voicemail for patient to set up physical with Dr. Danny Wiley before any refills are dispensed

## 2025-04-02 NOTE — TELEPHONE ENCOUNTER
Please schedule the patient with Inez Rodriguez for her annual physical which was due in September. No more refills until seen.

## (undated) DIAGNOSIS — F33.1 MODERATE EPISODE OF RECURRENT MAJOR DEPRESSIVE DISORDER (HCC): ICD-10-CM

## (undated) DIAGNOSIS — Z51.81 ENCOUNTER FOR THERAPEUTIC DRUG MONITORING: ICD-10-CM

## (undated) DIAGNOSIS — F41.9 ANXIETY: ICD-10-CM

## (undated) DIAGNOSIS — E66.9 OBESITY (BMI 30-39.9): ICD-10-CM

## (undated) DIAGNOSIS — E66.01 SEVERE OBESITY (HCC): ICD-10-CM

## (undated) DIAGNOSIS — R63.8 CRAVING FOR PARTICULAR FOOD: ICD-10-CM

## (undated) NOTE — LETTER
Date: 3/24/2021    Patient Name: Wai Eng          To Whom it may concern: The above patient was seen at the [facility] for treatment of a medical condition.     This patient should be excused from attending work from 3/22/21 through

## (undated) NOTE — LETTER
Date: 12/29/2021    Patient Name: Darryle Antonio          To Whom it may concern: This letter has been written at the patient's request. The above patient was seen at the [facility] for treatment of a medical condition.       The patient

## (undated) NOTE — LETTER
Date: 3/24/2021    Patient Name: Kari Ervin          To Whom it may concern: The above patient was seen at the [facility] for treatment of a medical condition.     This patient should be excused from attending work from 03/22/2021 thro

## (undated) NOTE — Clinical Note
Thank you for referring Rain Ge to the [facility]. I met with her and her Mom in consultation today. I have ordered labs, set up a nutrition consultation with our dietician, and completed an EKG.   She was started on Topamax t

## (undated) NOTE — LETTER
04/23/21        [address]      Dear Maria Luz Rodas,    [id number] records indicate that you have outstanding lab work and or testing that was ordered for you and has not yet been completed:  Orders Placed This Encounter      CB